# Patient Record
Sex: MALE | Race: WHITE | Employment: OTHER | URBAN - METROPOLITAN AREA
[De-identification: names, ages, dates, MRNs, and addresses within clinical notes are randomized per-mention and may not be internally consistent; named-entity substitution may affect disease eponyms.]

---

## 2017-01-27 ENCOUNTER — PATIENT MESSAGE (OUTPATIENT)
Dept: INTERNAL MEDICINE CLINIC | Facility: CLINIC | Age: 67
End: 2017-01-27

## 2017-01-27 RX ORDER — CLOTRIMAZOLE AND BETAMETHASONE DIPROPIONATE 10; .64 MG/G; MG/G
CREAM TOPICAL
Qty: 15 G | Refills: 3 | Status: SHIPPED | OUTPATIENT
Start: 2017-01-27 | End: 2018-05-24

## 2017-01-27 NOTE — TELEPHONE ENCOUNTER
From: Socorro Carvajal  To: Urbano Serrano MD  Sent: 1/27/2017 10:13 AM CST  Subject: Prescription Question    Hi Dr. Yogesh Schuster, Would you be able to renew my prescription which you gave me a few years ago for a rash I had. It was for.  CLOTRIMAZOLE-BETAMETHASO

## 2017-02-10 RX ORDER — HYDROCODONE BITARTRATE AND ACETAMINOPHEN 10; 325 MG/1; MG/1
1 TABLET ORAL EVERY 8 HOURS PRN
Qty: 90 TABLET | Refills: 0 | Status: CANCELLED | OUTPATIENT
Start: 2017-02-10

## 2017-02-10 NOTE — TELEPHONE ENCOUNTER
Refill request:  Norco  mg, take 1 tab q 8 hrs prn, #90, r-0  [place] 12/16/16    Lov: 12/5/16  Nov: none

## 2017-02-10 NOTE — TELEPHONE ENCOUNTER
From: David Carvajal  To: Oneyda Juan MD  Sent: 2/10/2017 3:56 PM CST  Subject: Medication Renewal Request    Original authorizing provider: Manoj Jean Baptiste.  MD David Gottlieb would like a refill of the following medications:  HYDROcodone-acetaminophen

## 2017-02-10 NOTE — TELEPHONE ENCOUNTER
From: Kusum Carvajal  To: Saba Rushing MD  Sent: 2/10/2017 3:53 PM CST  Subject: Medication Renewal Request    Original authorizing provider: Madonna Dennison.  MD Kusum Betancourt would like a refill of the following medications:  HYDROcodone-acetaminophen

## 2017-02-13 RX ORDER — HYDROCODONE BITARTRATE AND ACETAMINOPHEN 10; 325 MG/1; MG/1
1 TABLET ORAL EVERY 8 HOURS PRN
Qty: 90 TABLET | Refills: 0 | Status: SHIPPED | OUTPATIENT
Start: 2017-02-13 | End: 2017-03-15

## 2017-05-10 ENCOUNTER — PATIENT MESSAGE (OUTPATIENT)
Dept: NEUROLOGY | Facility: CLINIC | Age: 67
End: 2017-05-10

## 2017-05-10 NOTE — TELEPHONE ENCOUNTER
From: Angelito Carvajal  To: Angelika Aponte MD  Sent: 5/10/2017 9:02 AM CDT  Subject: Prescription Question    Dr Didi Francis, Could you please write me a Script. to renew my Margaret Rolon Qty [address]. Thank You.  Peter Caballero

## 2017-05-10 NOTE — TELEPHONE ENCOUNTER
Refill request for norco 10/325 mg, take 1 tab every 8 hrs as needed, #90, no refills    LOV: 12/5/16  NOV: none  Last [place]

## 2017-05-11 ENCOUNTER — PATIENT MESSAGE (OUTPATIENT)
Dept: NEUROLOGY | Facility: CLINIC | Age: 67
End: 2017-05-11

## 2017-05-11 RX ORDER — HYDROCODONE BITARTRATE AND ACETAMINOPHEN 10; 325 MG/1; MG/1
1 TABLET ORAL EVERY 8 HOURS PRN
Qty: 90 TABLET | Refills: 0 | Status: SHIPPED | OUTPATIENT
Start: 2017-05-11 | End: 2017-08-14

## 2017-05-11 NOTE — TELEPHONE ENCOUNTER
From: Minerva Carvajal  To: Jimena Kinney MD  Sent: 5/11/2017 11:31 AM CDT  Subject: Other    COULD YOU PLEASE VERIFY MY PRESCRIPTION RENEWAL REQUEST FOR [address] IS BEING PROCESSED. FOR SOME REASON IT WAS NOT LISTED IN MY MEDICATIONS.  [address]

## 2017-05-11 NOTE — TELEPHONE ENCOUNTER
From: Angelito Carvajal  To: Angelika Aponte MD  Sent: 5/11/2017 2:10 PM CDT  Subject: Lizeth Barillas, I've set a follow up appointment with Dr. Didi Francis for August, 21st. Also,please let me know when my prescription is ready to be picked up and I'll come next

## 2017-06-12 RX ORDER — ALPRAZOLAM 0.25 MG/1
TABLET ORAL
Qty: 90 TABLET | Refills: 1 | Status: SHIPPED
Start: 2017-06-12 | End: 2018-01-15

## 2017-07-06 PROBLEM — K40.91 RECURRENT LEFT INGUINAL HERNIA: Status: ACTIVE | Noted: 2017-07-06

## 2017-08-14 RX ORDER — HYDROCODONE BITARTRATE AND ACETAMINOPHEN 10; 325 MG/1; MG/1
1 TABLET ORAL EVERY 8 HOURS PRN
Qty: 90 TABLET | Refills: 0 | Status: SHIPPED | OUTPATIENT
Start: 2017-08-14 | End: 2017-11-10

## 2017-08-14 NOTE — TELEPHONE ENCOUNTER
Refill request for norco 10/325 mg, take 1 tab every 8 hrs as needed, #90, no refills    LOV: 12/5/16  NOV: 8/21/17  Last [place]

## 2017-08-14 NOTE — TELEPHONE ENCOUNTER
From: Homero Carvajal  Sent: 8/14/2017 11:10 AM CDT  Subject: Medication Renewal Request    Jesse SHAN Wei would like a refill of the following medications:  HYDROcodone-acetaminophen  MG Oral Tab [Ramon Moseley MD]    [facility]erred [facility]: [facility]

## 2017-08-17 PROBLEM — K64.2 GRADE III INTERNAL HEMORRHOIDS: Status: ACTIVE | Noted: 2017-08-17

## 2017-08-21 ENCOUNTER — OFFICE VISIT (OUTPATIENT)
Dept: NEUROLOGY | Facility: CLINIC | Age: 67
End: 2017-08-21

## 2017-08-21 VITALS
HEART RATE: 71 BPM | DIASTOLIC BLOOD PRESSURE: 62 MMHG | BODY MASS INDEX: 20 KG/M2 | OXYGEN SATURATION: 97 % | RESPIRATION RATE: 17 BRPM | WEIGHT: 143 LBS | SYSTOLIC BLOOD PRESSURE: 108 MMHG

## 2017-08-21 DIAGNOSIS — M54.2 NECK PAIN ON LEFT SIDE: ICD-10-CM

## 2017-08-21 DIAGNOSIS — M50.90 CERVICAL DISC DISEASE: ICD-10-CM

## 2017-08-21 DIAGNOSIS — M67.912 DYSFUNCTION OF LEFT ROTATOR CUFF: ICD-10-CM

## 2017-08-21 DIAGNOSIS — M48.02 CERVICAL STENOSIS OF SPINE: Primary | ICD-10-CM

## 2017-08-21 DIAGNOSIS — M25.512 ACUTE PAIN OF LEFT SHOULDER: ICD-10-CM

## 2017-08-21 PROCEDURE — 99214 OFFICE O/P EST MOD 30 MIN: CPT | Performed by: PHYSICAL MEDICINE & REHABILITATION

## 2017-08-21 NOTE — PROGRESS NOTES
Cervical Pain H & P    Chief Complaint:  Patient presents with:  Neck Pain: pt here for follow up with c/o neck pain that has been localized left>right side. pain is increased with turning head left and certain movements.  pt does not want to get recommende Mother      80 cause of death   • [de-identified] [OTHER] Mother    • Heart Attack Other      Myocardial infarction, uncle       Social History     Social History  Social History   Marital status:   Spouse name: N/A    Years of education: N/A  Number of patient pain in the bilateral neck   RIGHT rotation: 70 degrees Gives the patient pain in the right neck   LEFT rotation: 60 degrees Gives the patient pain in the left neck     Vascular upper extremity:   Right radial pulses: 2+   Left radial pulses: 2+ needed. The patient understands and agrees with the stated plan. Unknown Press SENDY Stover MD  8/21/2017

## 2017-08-21 NOTE — PATIENT INSTRUCTIONS
As of October 6th 2014, the Drug Enforcement Agency [facility]) is reclassifying all hydrocodone combination medications from Schedule III to Schedule II. This includes medications such as Norco, Vicodin, Lortab, Zohydro, and Vicoprofen.     What this means for y chart.      Plan  If the left shoulder is not better in one month, then I will do an ultrasound examination of the left shoulder. He will call me in one month to let me know how the left shoulder is doing.     I will hold on doing a left C7-T1 ILESI for

## 2017-08-29 ENCOUNTER — OFFICE VISIT (OUTPATIENT)
Dept: INTERNAL MEDICINE CLINIC | Facility: CLINIC | Age: 67
End: 2017-08-29

## 2017-08-29 VITALS
DIASTOLIC BLOOD PRESSURE: 64 MMHG | HEART RATE: 72 BPM | HEIGHT: 71 IN | BODY MASS INDEX: 20.02 KG/M2 | OXYGEN SATURATION: 98 % | TEMPERATURE: 97 F | WEIGHT: 143 LBS | SYSTOLIC BLOOD PRESSURE: 110 MMHG

## 2017-08-29 DIAGNOSIS — I10 ESSENTIAL HYPERTENSION, BENIGN: ICD-10-CM

## 2017-08-29 DIAGNOSIS — D50.8 OTHER IRON DEFICIENCY ANEMIA: ICD-10-CM

## 2017-08-29 DIAGNOSIS — Z00.00 WELCOME TO MEDICARE PREVENTIVE VISIT: Primary | ICD-10-CM

## 2017-08-29 DIAGNOSIS — R73.9 HYPERGLYCEMIA: ICD-10-CM

## 2017-08-29 DIAGNOSIS — Z12.5 PROSTATE CANCER SCREENING: ICD-10-CM

## 2017-08-29 PROBLEM — K64.2 GRADE III INTERNAL HEMORRHOIDS: Status: RESOLVED | Noted: 2017-08-17 | Resolved: 2017-08-29

## 2017-08-29 PROBLEM — D64.9 ABSOLUTE ANEMIA: Status: ACTIVE | Noted: 2017-08-29

## 2017-08-29 PROBLEM — D64.9 ABSOLUTE ANEMIA: Status: RESOLVED | Noted: 2017-08-29 | Resolved: 2017-08-29

## 2017-08-29 PROBLEM — K40.91 RECURRENT LEFT INGUINAL HERNIA: Status: RESOLVED | Noted: 2017-07-06 | Resolved: 2017-08-29

## 2017-08-29 LAB
ALBUMIN SERPL BCP-MCNC: 3.8 G/DL (ref 3.5–4.8)
ALBUMIN/GLOB SERPL: 1.1 {RATIO} (ref 1–2)
ALP SERPL-CCNC: 45 U/L (ref 32–100)
ALT SERPL-CCNC: 17 U/L (ref 17–63)
ANION GAP SERPL CALC-SCNC: 8 MMOL/L (ref 0–18)
AST SERPL-CCNC: 27 U/L (ref 15–41)
BASOPHILS # BLD: 0.1 K/UL (ref 0–0.2)
BASOPHILS NFR BLD: 1 %
BILIRUB SERPL-MCNC: 0.5 MG/DL (ref 0.3–1.2)
BUN SERPL-MCNC: 9 MG/DL (ref 8–20)
BUN/CREAT SERPL: 13 (ref 10–20)
CALCIUM SERPL-MCNC: 9.3 MG/DL (ref 8.5–10.5)
CHLORIDE SERPL-SCNC: 101 MMOL/L (ref 95–110)
CHOLEST SERPL-MCNC: 174 MG/DL (ref 110–200)
CO2 SERPL-SCNC: 29 MMOL/L (ref 22–32)
CREAT SERPL-MCNC: 0.69 MG/DL (ref 0.5–1.5)
EOSINOPHIL # BLD: 0.1 K/UL (ref 0–0.7)
EOSINOPHIL NFR BLD: 3 %
ERYTHROCYTE [DISTWIDTH] IN BLOOD BY AUTOMATED COUNT: 14.4 % (ref 11–15)
GLOBULIN PLAS-MCNC: 3.4 G/DL (ref 2.5–3.7)
GLUCOSE SERPL-MCNC: 77 MG/DL (ref 70–99)
HCT VFR BLD AUTO: 41.1 % (ref 41–52)
HDLC SERPL-MCNC: 53 MG/DL
HGB BLD-MCNC: 13.8 G/DL (ref 13.5–17.5)
LDLC SERPL CALC-MCNC: 105 MG/DL (ref 0–99)
LYMPHOCYTES # BLD: 1.6 K/UL (ref 1–4)
LYMPHOCYTES NFR BLD: 28 %
MCH RBC QN AUTO: 30.7 PG (ref 27–32)
MCHC RBC AUTO-ENTMCNC: 33.5 G/DL (ref 32–37)
MCV RBC AUTO: 91.7 FL (ref 80–100)
MONOCYTES # BLD: 0.6 K/UL (ref 0–1)
MONOCYTES NFR BLD: 10 %
NEUTROPHILS # BLD AUTO: 3.4 K/UL (ref 1.8–7.7)
NEUTROPHILS NFR BLD: 58 %
NONHDLC SERPL-MCNC: 121 MG/DL
OSMOLALITY UR CALC.SUM OF ELEC: 283 MOSM/KG (ref 275–295)
PLATELET # BLD AUTO: 277 K/UL (ref 140–400)
PMV BLD AUTO: 8.3 FL (ref 7.4–10.3)
POTASSIUM SERPL-SCNC: 4.5 MMOL/L (ref 3.3–5.1)
PROT SERPL-MCNC: 7.2 G/DL (ref 5.9–8.4)
PSA SERPL-MCNC: 1.7 NG/ML (ref 0–4)
RBC # BLD AUTO: 4.48 M/UL (ref 4.5–5.9)
SODIUM SERPL-SCNC: 138 MMOL/L (ref 136–144)
TRIGL SERPL-MCNC: 81 MG/DL (ref 1–149)
WBC # BLD AUTO: 5.8 K/UL (ref 4–11)

## 2017-08-29 PROCEDURE — 36415 COLL VENOUS BLD VENIPUNCTURE: CPT | Performed by: INTERNAL MEDICINE

## 2017-08-29 PROCEDURE — G0439 PPPS, SUBSEQ VISIT: HCPCS | Performed by: INTERNAL MEDICINE

## 2017-08-29 NOTE — H&P
HPI:   Sofia Yi is a 74year old male who presents for a Medicare Initial Preventative Physical exam.    Patient Active Problem List:     Prostate cancer screening     Essential hypertension, benign     Iron deficiency anemia     Hyperglycemia     W Have you had any memory issues?: 0-No    Fall/Risk Scorin    Scoring Interpretation: 0 - 3 No Risk     Depression Screening (PHQ-2/PHQ-9): Over the LAST 2 WEEKS   Little interest or pleasure in doing things (over the last two weeks)?: Not at all 11-: ELECTROCARDIOGRAM, COMPLETE      Comment: Scan to media tab 11-  No date: HERNIA SURGERY      Comment: Hernia repair  08/2017: HERNIA SURGERY  No date: KNEE ARTHROSCOPY Right   Family History   Problem Relation Age of Onset   • Heart D developed, well nourished, in no apparent distress  SKIN: no rashes, no suspicious lesions  HEENT: atraumatic, normocephalic, ears and throat are clear  Hearing assessed via: [place]  EYES: PERRLA, EOMI, conjunctiva are clear  Right Eye Visual Acuity: Blood Sugar (FSB)Annually No results found for: GLUCOSE    Cardiovascular Disease Screening     LDL Annually LDL-CHOLESTEROL (mg/dL (calc))   Date Value   08/26/2016 95        EKG One Time done    Colorectal Cancer Screening      Colonoscopy Screen every 1 Value   08/26/2016 95    No flowsheet data found. Dilated Eye exam  Annually No flowsheet data found. No flowsheet data found. COPD      Spirometry Testing Annually No results found for this or any previous visit. No flowsheet data found.         SOLER

## 2017-08-29 NOTE — ASSESSMENT & PLAN NOTE
Labs today , has had his colonoscopy 4 years ago , due next year for another one. Sees the eye doctor every 3 months. Will have hemorrhoid surgery in Oct.  And he had left hernia repair recently .

## 2017-08-30 LAB — HBA1C MFR BLD: 5.5 % (ref 4–6)

## 2017-10-02 ENCOUNTER — LAB REQUISITION (OUTPATIENT)
Dept: LAB | Facility: HOSPITAL | Age: 67
End: 2017-10-02
Payer: MEDICARE

## 2017-10-02 DIAGNOSIS — Z01.89 ENCOUNTER FOR OTHER SPECIFIED SPECIAL EXAMINATIONS (CODE): ICD-10-CM

## 2017-10-02 PROCEDURE — 88304 TISSUE EXAM BY PATHOLOGIST: CPT | Performed by: SURGERY

## 2017-11-10 NOTE — TELEPHONE ENCOUNTER
From: Michael Carvajal  Sent: 11/10/2017 7:43 AM CST  Subject: Medication Renewal Request    Jesse TORREZ Wei would like a refill of the following medications:     HYDROcodone-acetaminophen  MG Oral Tab [Ramon Moseley MD]   Patient Comment: Dr. Stuart Gunter

## 2017-11-10 NOTE — TELEPHONE ENCOUNTER
Refill request for Norco  mg take 1 tab q8hr prn, qt:90 0 refills    LOV: 8/21/17  NOV: None  Last [place]18[place]17 [place]:90 [place]

## 2017-11-13 RX ORDER — HYDROCODONE BITARTRATE AND ACETAMINOPHEN 10; 325 MG/1; MG/1
1 TABLET ORAL EVERY 8 HOURS PRN
Qty: 90 TABLET | Refills: 0 | Status: SHIPPED | OUTPATIENT
Start: 2017-11-13 | End: 2018-01-02 | Stop reason: ALTCHOICE

## 2017-11-20 RX ORDER — AMLODIPINE BESYLATE AND BENAZEPRIL HYDROCHLORIDE 5; 10 MG/1; MG/1
CAPSULE ORAL
Qty: 90 CAPSULE | Refills: 0 | Status: SHIPPED | OUTPATIENT
Start: 2017-11-20 | End: 2018-02-13

## 2018-01-02 RX ORDER — HYDROCODONE BITARTRATE AND ACETAMINOPHEN 10; 325 MG/1; MG/1
1 TABLET ORAL EVERY 8 HOURS PRN
COMMUNITY
End: 2018-01-09

## 2018-01-08 NOTE — H&P
[address] [place] [address] [address] Patient Status:  [facility] Outpatient Surgery    3/19/1950 MRN [id number]   Location [place] [address] Attending Mello Hanks, *   Hosp Day # 0 PCP Nayeli Toro Smokeless tobacco: Never Used                      Alcohol use: Yes           0.0 oz/week     Comment: 1-2 times per month, beer or wine    Allergies/Medications: Allergies: No Known Allergies    No prescriptions prior to admission.     Review of Syst and surgical management. The shoulder has been bothering him for some time. It is hard for him to get a coat or shirt on and off as well as to sleep on the left side at night. He wants to undergo surgical repair.  We scheduled this for early January after h

## 2018-01-09 ENCOUNTER — SURGERY (OUTPATIENT)
Age: 68
End: 2018-01-09

## 2018-01-09 ENCOUNTER — ANESTHESIA (OUTPATIENT)
Dept: SURGERY | Facility: HOSPITAL | Age: 68
End: 2018-01-09
Payer: MEDICARE

## 2018-01-09 ENCOUNTER — HOSPITAL ENCOUNTER (OUTPATIENT)
Facility: HOSPITAL | Age: 68
Setting detail: HOSPITAL OUTPATIENT SURGERY
Discharge: HOME | End: 2018-01-09
Attending: ORTHOPAEDIC SURGERY | Admitting: ORTHOPAEDIC SURGERY
Payer: MEDICARE

## 2018-01-09 ENCOUNTER — ANESTHESIA EVENT (OUTPATIENT)
Dept: SURGERY | Facility: HOSPITAL | Age: 68
End: 2018-01-09
Payer: MEDICARE

## 2018-01-09 VITALS
TEMPERATURE: 98 F | HEART RATE: 90 BPM | DIASTOLIC BLOOD PRESSURE: 63 MMHG | OXYGEN SATURATION: 96 % | RESPIRATION RATE: 14 BRPM | BODY MASS INDEX: 19.6 KG/M2 | WEIGHT: 140 LBS | HEIGHT: 71 IN | SYSTOLIC BLOOD PRESSURE: 115 MMHG

## 2018-01-09 DIAGNOSIS — M75.102 LEFT ROTATOR CUFF TEAR: Primary | ICD-10-CM

## 2018-01-09 PROCEDURE — 64415 NJX AA&/STRD BRCH PLXS IMG: CPT | Performed by: ORTHOPAEDIC SURGERY

## 2018-01-09 PROCEDURE — 3E0T3BZ INTRODUCTION OF ANESTHETIC AGENT INTO PERIPHERAL NERVES AND PLEXI, PERCUTANEOUS APPROACH: ICD-10-PCS | Performed by: ANESTHESIOLOGY

## 2018-01-09 PROCEDURE — 0LQ24ZZ REPAIR LEFT SHOULDER TENDON, PERCUTANEOUS ENDOSCOPIC APPROACH: ICD-10-PCS | Performed by: ORTHOPAEDIC SURGERY

## 2018-01-09 PROCEDURE — 0RNK4ZZ RELEASE LEFT SHOULDER JOINT, PERCUTANEOUS ENDOSCOPIC APPROACH: ICD-10-PCS | Performed by: ORTHOPAEDIC SURGERY

## 2018-01-09 PROCEDURE — 76942 ECHO GUIDE FOR BIOPSY: CPT | Performed by: ORTHOPAEDIC SURGERY

## 2018-01-09 PROCEDURE — 99152 MOD SED SAME PHYS/QHP 5/>YRS: CPT | Performed by: ORTHOPAEDIC SURGERY

## 2018-01-09 DEVICE — HEALIX ADVANCE BR ANCHOR W/ORTHOCORD AND NEEDLES TCP/PLGA ABSORBABLE ANCHOR (1) VIOLET (1) BLUE STRAND, SIZE 2 (5 METRIC) ORTHOCORD BRAIDED COMPOSITE SUTURE, 36 INCHES (91CM) WITH (4) MO-7 TAPERED NEEDLES 5.5MM
Type: IMPLANTABLE DEVICE | Site: SHOULDER | Status: FUNCTIONAL
Brand: HEALIX ADVANCE ORTHOCORD

## 2018-01-09 DEVICE — HEALIX ADVANCE KNOTLESS BR ANCHOR TCP/PLGA ABSORBABLE ANCHOR 5.5MM
Type: IMPLANTABLE DEVICE | Site: SHOULDER | Status: FUNCTIONAL
Brand: HEALIX ADVANCE

## 2018-01-09 RX ORDER — CEFAZOLIN SODIUM 1 G/3ML
INJECTION, POWDER, FOR SOLUTION INTRAMUSCULAR; INTRAVENOUS AS NEEDED
Status: DISCONTINUED | OUTPATIENT
Start: 2018-01-09 | End: 2018-01-09 | Stop reason: SURG

## 2018-01-09 RX ORDER — MORPHINE SULFATE 2 MG/ML
2 INJECTION, SOLUTION INTRAMUSCULAR; INTRAVENOUS EVERY 10 MIN PRN
Status: DISCONTINUED | OUTPATIENT
Start: 2018-01-09 | End: 2018-01-09

## 2018-01-09 RX ORDER — HYDROMORPHONE HYDROCHLORIDE 1 MG/ML
0.2 INJECTION, SOLUTION INTRAMUSCULAR; INTRAVENOUS; SUBCUTANEOUS EVERY 5 MIN PRN
Status: DISCONTINUED | OUTPATIENT
Start: 2018-01-09 | End: 2018-01-09

## 2018-01-09 RX ORDER — ROPIVACAINE HYDROCHLORIDE 5 MG/ML
INJECTION, SOLUTION EPIDURAL; INFILTRATION; PERINEURAL AS NEEDED
Status: DISCONTINUED | OUTPATIENT
Start: 2018-01-09 | End: 2018-01-09 | Stop reason: SURG

## 2018-01-09 RX ORDER — SODIUM CHLORIDE, SODIUM LACTATE, POTASSIUM CHLORIDE, CALCIUM CHLORIDE 600; 310; 30; 20 MG/100ML; MG/100ML; MG/100ML; MG/100ML
INJECTION, SOLUTION INTRAVENOUS CONTINUOUS
Status: DISCONTINUED | OUTPATIENT
Start: 2018-01-09 | End: 2018-01-09

## 2018-01-09 RX ORDER — HALOPERIDOL 5 MG/ML
0.25 INJECTION INTRAMUSCULAR ONCE AS NEEDED
Status: DISCONTINUED | OUTPATIENT
Start: 2018-01-09 | End: 2018-01-09

## 2018-01-09 RX ORDER — FAMOTIDINE 20 MG/1
20 TABLET ORAL ONCE
Status: DISCONTINUED | OUTPATIENT
Start: 2018-01-09 | End: 2018-01-09 | Stop reason: HOSPADM

## 2018-01-09 RX ORDER — DEXAMETHASONE SODIUM PHOSPHATE 4 MG/ML
VIAL (ML) INJECTION AS NEEDED
Status: DISCONTINUED | OUTPATIENT
Start: 2018-01-09 | End: 2018-01-09 | Stop reason: SURG

## 2018-01-09 RX ORDER — MORPHINE SULFATE 4 MG/ML
4 INJECTION, SOLUTION INTRAMUSCULAR; INTRAVENOUS EVERY 10 MIN PRN
Status: DISCONTINUED | OUTPATIENT
Start: 2018-01-09 | End: 2018-01-09

## 2018-01-09 RX ORDER — ROCURONIUM BROMIDE 10 MG/ML
INJECTION, SOLUTION INTRAVENOUS AS NEEDED
Status: DISCONTINUED | OUTPATIENT
Start: 2018-01-09 | End: 2018-01-09 | Stop reason: SURG

## 2018-01-09 RX ORDER — MORPHINE SULFATE 10 MG/ML
6 INJECTION, SOLUTION INTRAMUSCULAR; INTRAVENOUS EVERY 10 MIN PRN
Status: DISCONTINUED | OUTPATIENT
Start: 2018-01-09 | End: 2018-01-09

## 2018-01-09 RX ORDER — HYDROCODONE BITARTRATE AND ACETAMINOPHEN 5; 325 MG/1; MG/1
1 TABLET ORAL EVERY 4 HOURS PRN
Qty: 40 TABLET | Refills: 0 | Status: SHIPPED | OUTPATIENT
Start: 2018-01-09 | End: 2018-03-01 | Stop reason: DRUGHIGH

## 2018-01-09 RX ORDER — EPHEDRINE SULFATE 50 MG/ML
INJECTION, SOLUTION INTRAVENOUS AS NEEDED
Status: DISCONTINUED | OUTPATIENT
Start: 2018-01-09 | End: 2018-01-09 | Stop reason: SURG

## 2018-01-09 RX ORDER — CEFAZOLIN SODIUM/WATER 2 G/20 ML
2 SYRINGE (ML) INTRAVENOUS ONCE
Status: DISCONTINUED | OUTPATIENT
Start: 2018-01-09 | End: 2018-01-09 | Stop reason: HOSPADM

## 2018-01-09 RX ORDER — ONDANSETRON 2 MG/ML
4 INJECTION INTRAMUSCULAR; INTRAVENOUS ONCE AS NEEDED
Status: DISCONTINUED | OUTPATIENT
Start: 2018-01-09 | End: 2018-01-09

## 2018-01-09 RX ORDER — NALOXONE HYDROCHLORIDE 0.4 MG/ML
80 INJECTION, SOLUTION INTRAMUSCULAR; INTRAVENOUS; SUBCUTANEOUS AS NEEDED
Status: DISCONTINUED | OUTPATIENT
Start: 2018-01-09 | End: 2018-01-09

## 2018-01-09 RX ORDER — HYDROMORPHONE HYDROCHLORIDE 1 MG/ML
0.6 INJECTION, SOLUTION INTRAMUSCULAR; INTRAVENOUS; SUBCUTANEOUS EVERY 5 MIN PRN
Status: DISCONTINUED | OUTPATIENT
Start: 2018-01-09 | End: 2018-01-09

## 2018-01-09 RX ORDER — HYDROMORPHONE HYDROCHLORIDE 1 MG/ML
0.4 INJECTION, SOLUTION INTRAMUSCULAR; INTRAVENOUS; SUBCUTANEOUS EVERY 5 MIN PRN
Status: DISCONTINUED | OUTPATIENT
Start: 2018-01-09 | End: 2018-01-09

## 2018-01-09 RX ORDER — HYDROCODONE BITARTRATE AND ACETAMINOPHEN 5; 325 MG/1; MG/1
1 TABLET ORAL AS NEEDED
Status: DISCONTINUED | OUTPATIENT
Start: 2018-01-09 | End: 2018-01-09

## 2018-01-09 RX ORDER — HYDROCODONE BITARTRATE AND ACETAMINOPHEN 5; 325 MG/1; MG/1
2 TABLET ORAL AS NEEDED
Status: DISCONTINUED | OUTPATIENT
Start: 2018-01-09 | End: 2018-01-09

## 2018-01-09 RX ORDER — LIDOCAINE HYDROCHLORIDE 10 MG/ML
INJECTION, SOLUTION EPIDURAL; INFILTRATION; INTRACAUDAL; PERINEURAL AS NEEDED
Status: DISCONTINUED | OUTPATIENT
Start: 2018-01-09 | End: 2018-01-09 | Stop reason: SURG

## 2018-01-09 RX ORDER — HYDROCODONE BITARTRATE AND ACETAMINOPHEN 5; 325 MG/1; MG/1
1 TABLET ORAL EVERY 4 HOURS PRN
Status: DISCONTINUED | OUTPATIENT
Start: 2018-01-09 | End: 2018-01-09

## 2018-01-09 RX ORDER — ONDANSETRON 2 MG/ML
INJECTION INTRAMUSCULAR; INTRAVENOUS AS NEEDED
Status: DISCONTINUED | OUTPATIENT
Start: 2018-01-09 | End: 2018-01-09 | Stop reason: SURG

## 2018-01-09 RX ORDER — ACETAMINOPHEN 500 MG
1000 TABLET ORAL ONCE
Status: COMPLETED | OUTPATIENT
Start: 2018-01-09 | End: 2018-01-09

## 2018-01-09 RX ORDER — GLYCOPYRROLATE 0.2 MG/ML
INJECTION INTRAMUSCULAR; INTRAVENOUS AS NEEDED
Status: DISCONTINUED | OUTPATIENT
Start: 2018-01-09 | End: 2018-01-09 | Stop reason: SURG

## 2018-01-09 RX ORDER — MIDAZOLAM HYDROCHLORIDE 1 MG/ML
INJECTION INTRAMUSCULAR; INTRAVENOUS AS NEEDED
Status: DISCONTINUED | OUTPATIENT
Start: 2018-01-09 | End: 2018-01-09 | Stop reason: SURG

## 2018-01-09 RX ORDER — METOCLOPRAMIDE 10 MG/1
10 TABLET ORAL ONCE
Status: DISCONTINUED | OUTPATIENT
Start: 2018-01-09 | End: 2018-01-09 | Stop reason: HOSPADM

## 2018-01-09 RX ADMIN — ROPIVACAINE HYDROCHLORIDE 15 ML: 5 INJECTION, SOLUTION EPIDURAL; INFILTRATION; PERINEURAL at 10:52:00

## 2018-01-09 RX ADMIN — SODIUM CHLORIDE, SODIUM LACTATE, POTASSIUM CHLORIDE, CALCIUM CHLORIDE: 600; 310; 30; 20 INJECTION, SOLUTION INTRAVENOUS at 13:52:00

## 2018-01-09 RX ADMIN — SODIUM CHLORIDE, SODIUM LACTATE, POTASSIUM CHLORIDE, CALCIUM CHLORIDE: 600; 310; 30; 20 INJECTION, SOLUTION INTRAVENOUS at 11:40:00

## 2018-01-09 RX ADMIN — EPHEDRINE SULFATE 5 MG: 50 INJECTION, SOLUTION INTRAVENOUS at 13:00:00

## 2018-01-09 RX ADMIN — GLYCOPYRROLATE 0.1 MG: 0.2 INJECTION INTRAMUSCULAR; INTRAVENOUS at 11:44:00

## 2018-01-09 RX ADMIN — CEFAZOLIN SODIUM 2 G: 1 INJECTION, POWDER, FOR SOLUTION INTRAMUSCULAR; INTRAVENOUS at 12:00:00

## 2018-01-09 RX ADMIN — LIDOCAINE HYDROCHLORIDE 40 MG: 10 INJECTION, SOLUTION EPIDURAL; INFILTRATION; INTRACAUDAL; PERINEURAL at 11:44:00

## 2018-01-09 RX ADMIN — ROCURONIUM BROMIDE 5 MG: 10 INJECTION, SOLUTION INTRAVENOUS at 11:44:00

## 2018-01-09 RX ADMIN — EPHEDRINE SULFATE 10 MG: 50 INJECTION, SOLUTION INTRAVENOUS at 11:59:00

## 2018-01-09 RX ADMIN — ONDANSETRON 4 MG: 2 INJECTION INTRAMUSCULAR; INTRAVENOUS at 12:03:00

## 2018-01-09 RX ADMIN — SODIUM CHLORIDE, SODIUM LACTATE, POTASSIUM CHLORIDE, CALCIUM CHLORIDE: 600; 310; 30; 20 INJECTION, SOLUTION INTRAVENOUS at 11:30:00

## 2018-01-09 RX ADMIN — SODIUM CHLORIDE, SODIUM LACTATE, POTASSIUM CHLORIDE, CALCIUM CHLORIDE: 600; 310; 30; 20 INJECTION, SOLUTION INTRAVENOUS at 13:05:00

## 2018-01-09 RX ADMIN — MIDAZOLAM HYDROCHLORIDE 2 MG: 1 INJECTION INTRAMUSCULAR; INTRAVENOUS at 10:46:00

## 2018-01-09 RX ADMIN — EPHEDRINE SULFATE 10 MG: 50 INJECTION, SOLUTION INTRAVENOUS at 12:55:00

## 2018-01-09 RX ADMIN — SODIUM CHLORIDE, SODIUM LACTATE, POTASSIUM CHLORIDE, CALCIUM CHLORIDE: 600; 310; 30; 20 INJECTION, SOLUTION INTRAVENOUS at 12:55:00

## 2018-01-09 RX ADMIN — DEXAMETHASONE SODIUM PHOSPHATE 4 MG: 4 MG/ML VIAL (ML) INJECTION at 12:03:00

## 2018-01-09 RX ADMIN — ROPIVACAINE HYDROCHLORIDE 15 ML: 5 INJECTION, SOLUTION EPIDURAL; INFILTRATION; PERINEURAL at 10:56:00

## 2018-01-09 RX ADMIN — EPHEDRINE SULFATE 5 MG: 50 INJECTION, SOLUTION INTRAVENOUS at 13:08:00

## 2018-01-09 RX ADMIN — LIDOCAINE HYDROCHLORIDE 3 ML: 10 INJECTION, SOLUTION EPIDURAL; INFILTRATION; INTRACAUDAL; PERINEURAL at 10:51:00

## 2018-01-09 NOTE — ANESTHESIA PROCEDURE NOTES
Peripheral Block    Anesthesiologist:  Brandi Sparrow  Performed by:   Anesthesiologist  Patient Location:  PACU  Start Time:  1/9/2018 10:46 AM  End Time:  1/9/2018 10:57 AM  Site Identification: ultrasound guided, real time ultrasound guided and IMAGE STORED

## 2018-01-09 NOTE — OPERATIVE REPORT
[facility]    PATIENT'S NAME: Delma Wu   ATTENDING PHYSICIAN: Es Washington MD   OPERATING PHYSICIAN: Es Washington MD   PATIENT ACCOUNT#:   [id number]    LOCATION:  [facility] [address] PACU [address] [place] [address]  MEDICAL RECORD #:   [id number]       D intact circumferentially. There was a moderate amount of synovitis. There was a full-thickness rotator cuff tear at the supraspinatus footprint into the greater tuberosity, and this was retracted. The subacromial space was entered next.   There was a sig

## 2018-01-09 NOTE — INTERVAL H&P NOTE
Pre-op Diagnosis: ROTATOR CUFF TEAR LEFT SHOULDER    The above referenced H&P was reviewed by Lennox Nissen, MD on 1/9/2018, the patient was examined and no significant changes have occurred in the patient's condition since the H&P was performed.

## 2018-01-09 NOTE — ANESTHESIA POSTPROCEDURE EVALUATION
Patient: Angelito Carvajal    Procedure Summary     Date:  01/09/18 Room / Location:  [address] [place] [address] MAIN OR 12 / [address] [place] [address] MAIN OR    Anesthesia Start:  9635 Anesthesia Stop:  5233    Procedure:  SHOULDER ARTHROSCOPY ROTATOR CUFF REPAIR (Left Shoulder) Diagnosis:  (ROTATOR CU

## 2018-01-09 NOTE — BRIEF OP NOTE
Pre-Operative Diagnosis: ROTATOR CUFF TEAR LEFT SHOULDER     Post-Operative Diagnosis: ROTATOR CUFF TEAR LEFT SHOULDER     Procedure Performed:   Procedure(s):  ARTHROSCOPIC ROTATOR CUFF REPAIR LEFT SHOULDER    Surgeon(s) and Role:     * Cely Alcala

## 2018-01-09 NOTE — ANESTHESIA PREPROCEDURE EVALUATION
Anesthesia PreOp Note    HPI:     Rakesh Santos is a 74year old male who presents for preoperative consultation requested by: Odin Bentley MD    Date of Surgery: 1/9/2018    Procedure(s):  SHOULDER ARTHROSCOPY ROTATOR CUFF REPAIR  Indication: R 1-0.05 % External Cream Apply bid (Patient taking differently: Apply 1 Application topically 2 (two) times daily as needed.  Apply bid ) Disp: 15 g Rfl: 3 More than a month at Unknown time       Current Facility-Administered Medications Ordered in Epic:  la (36.7 °C). His blood pressure is 122/77 and his pulse is 61. His respiration is 12 and oxygen saturation is 99%.     01/02/18  1506 01/09/18  0906 01/09/18  0958 01/09/18  1008   BP:  130/76 125/78 122/77   BP Location:  Right arm Right arm    Pulse:  74 64

## 2018-01-15 RX ORDER — ALPRAZOLAM 0.25 MG/1
TABLET ORAL
Qty: 90 TABLET | Refills: 1 | Status: SHIPPED
Start: 2018-01-15 | End: 2018-07-30

## 2018-02-13 RX ORDER — AMLODIPINE BESYLATE AND BENAZEPRIL HYDROCHLORIDE 5; 10 MG/1; MG/1
CAPSULE ORAL
Qty: 90 CAPSULE | Refills: 3 | Status: SHIPPED | OUTPATIENT
Start: 2018-02-13 | End: 2019-02-10

## 2018-02-14 ENCOUNTER — TELEPHONE (OUTPATIENT)
Dept: NEUROLOGY | Facility: CLINIC | Age: 68
End: 2018-02-14

## 2018-02-14 NOTE — TELEPHONE ENCOUNTER
Pt states that he only received refill from another doctor due to him having surgery, otherwise he would only have had Dr. Erwin Santiago fill medication. He has his NOV 3/21/18 and is wondering if he can still receive refills?  Please advise

## 2018-03-01 ENCOUNTER — PATIENT MESSAGE (OUTPATIENT)
Dept: NEUROLOGY | Facility: CLINIC | Age: 68
End: 2018-03-01

## 2018-03-01 NOTE — TELEPHONE ENCOUNTER
From: Evie Carvajal  To: Vivian Upton MD  Sent: 3/1/2018 10:40 AM CST  Subject: Prescription Question     Dr. Hi Jacobs, I'm all out of the Norco, 10-325T, Qty 90 for my neck pain.  I do have Scheduled appointment for March 21st. Could you write a Script to

## 2018-03-01 NOTE — TELEPHONE ENCOUNTER
Medication request: Norco 10-325mg q8h prn pain    LOV 8/21/17  NOV 3/21/18    [place]:   Norco 5-325mg #40 prescribed by JHOAN Zuniga on 1/9/2018, Norco 10-325mg #90 filled on 11/16/17 by Dr. Daniel Block

## 2018-03-05 RX ORDER — HYDROCODONE BITARTRATE AND ACETAMINOPHEN 10; 325 MG/1; MG/1
1 TABLET ORAL EVERY 8 HOURS PRN
Qty: 90 TABLET | Refills: 0 | Status: SHIPPED | OUTPATIENT
Start: 2018-03-05 | End: 2018-06-05

## 2018-03-05 RX ORDER — HYDROCODONE BITARTRATE AND ACETAMINOPHEN 10; 325 MG/1; MG/1
1 TABLET ORAL EVERY 8 HOURS PRN
Qty: 90 TABLET | Refills: 0 | Status: CANCELLED
Start: 2018-03-05

## 2018-03-06 NOTE — TELEPHONE ENCOUNTER
From: Susu Carvajal  Sent: 3/5/2018 7:04 PM CST  Subject: Medication Renewal Request    Jesse GARCIATiffany Wei would like a refill of the following medications:     HYDROcodone-acetaminophen  MG Oral Tab [Ramon Moseley MD]    Preferred pharmacy: [facility]

## 2018-03-21 ENCOUNTER — OFFICE VISIT (OUTPATIENT)
Dept: NEUROLOGY | Facility: CLINIC | Age: 68
End: 2018-03-21

## 2018-03-21 VITALS
BODY MASS INDEX: 20.3 KG/M2 | HEIGHT: 71 IN | RESPIRATION RATE: 16 BRPM | DIASTOLIC BLOOD PRESSURE: 86 MMHG | WEIGHT: 145 LBS | SYSTOLIC BLOOD PRESSURE: 140 MMHG | HEART RATE: 60 BPM

## 2018-03-21 DIAGNOSIS — M50.90 CERVICAL DISC DISEASE: ICD-10-CM

## 2018-03-21 DIAGNOSIS — M54.2 NECK PAIN ON LEFT SIDE: ICD-10-CM

## 2018-03-21 DIAGNOSIS — M48.02 FORAMINAL STENOSIS OF CERVICAL REGION: ICD-10-CM

## 2018-03-21 DIAGNOSIS — M54.12 CERVICAL RADICULOPATHY: Primary | ICD-10-CM

## 2018-03-21 PROCEDURE — 99214 OFFICE O/P EST MOD 30 MIN: CPT | Performed by: PHYSICAL MEDICINE & REHABILITATION

## 2018-03-21 NOTE — PATIENT INSTRUCTIONS
As of October 6th 2014, the Drug Enforcement Agency [facility]) is reclassifying all hydrocodone combination medications from Schedule III to Schedule II. This includes medications such as Norco, Vicodin, Lortab, Zohydro, and Vicoprofen.     What this means for y chart.      Plan  He will finish off the PT on the left shoulder. He will consider doing neck PT after the above is done. He would like to hold on doing a left C7-T1 ILESI for now. The patient will continue with their current pain medications.

## 2018-03-21 NOTE — PROGRESS NOTES
Cervical Pain H & P    Chief Complaint:  Patient presents with:  Neck Pain: Patient presents for follow up on neck pain, last office visit 8/21/17. States he still has left side neck pain, does not radiate, pain increases when turns neck.  Rated pain a 7/10 Social History     Social History  Social History   Marital status:   Spouse name: N/A    Years of education: N/A  Number of children: N/A     Occupational History  None on file     Social History Main Topics   Smoking status: Never Smoker neck   RIGHT rotation: 60 degrees Gives the patient pain in the left neck   LEFT rotation: 45 degrees Gives the patient pain in the left neck     Vascular upper extremity:   Right radial pulses: 2+   Left radial pulses: 2+      Neurological Upper Extremity

## 2018-05-24 RX ORDER — CLOTRIMAZOLE AND BETAMETHASONE DIPROPIONATE 10; .64 MG/G; MG/G
CREAM TOPICAL
Qty: 15 G | Refills: 3 | Status: SHIPPED
Start: 2018-05-24 | End: 2018-09-27

## 2018-05-25 NOTE — TELEPHONE ENCOUNTER
From: James Monge  Sent: 5/24/2018 7:01 PM CDT  Subject: Medication Renewal Request    Jesse SHAN  Wei would like a refill of the following medications:     clotrimazole-betamethasone 1-0.05 % External Cream Sona Schwartz MD]   Patient Comment: Aaron Wang

## 2018-06-05 NOTE — TELEPHONE ENCOUNTER
Refill request for norco 10/325 mg, take 1 tab every 8 hrs as needed, #90, no refills    LOV: 3/21/18  NOV: none  Last [place]

## 2018-06-05 NOTE — TELEPHONE ENCOUNTER
From: Kassandra Carvajal  Sent: 6/5/2018 9:39 AM CDT  Subject: Medication Renewal Request    Jesse Santosswati would like a refill of the following medications:     HYDROcodone-acetaminophen  MG Oral Tab [Ramon Moseley MD]   Patient Comment: Dr. Nathan Magdaleno

## 2018-06-07 RX ORDER — HYDROCODONE BITARTRATE AND ACETAMINOPHEN 10; 325 MG/1; MG/1
1 TABLET ORAL EVERY 8 HOURS PRN
Qty: 90 TABLET | Refills: 0 | Status: SHIPPED | OUTPATIENT
Start: 2018-06-07 | End: 2018-09-12

## 2018-06-08 NOTE — TELEPHONE ENCOUNTER
Prescription locked in clinical drawer as patient will need to sign a pain contract  Routed to front office staff to schedule follow up appt    No pain contract on file

## 2018-06-12 NOTE — TELEPHONE ENCOUNTER
Pain contract reviewed and signed by patient  Prescription is ready for  at the [facility] front office.   Patient picked up rx, photo ID verified

## 2018-06-13 ENCOUNTER — MED REC SCAN ONLY (OUTPATIENT)
Dept: NEUROLOGY | Facility: CLINIC | Age: 68
End: 2018-06-13

## 2018-06-16 ENCOUNTER — PATIENT MESSAGE (OUTPATIENT)
Dept: INTERNAL MEDICINE CLINIC | Facility: CLINIC | Age: 68
End: 2018-06-16

## 2018-06-16 NOTE — TELEPHONE ENCOUNTER
From: Alexandria Carvajal  To: Carmen Ruiz MD  Sent: 6/16/2018 1:07 PM CDT  Subject: Other    Hi Dr. Jailyn Spaulding, I need to get in to see you on Monday if you can squeeze me in. I have a severe rash on my Penis again.  Kezia had it for about two weeks and been usin

## 2018-06-18 ENCOUNTER — OFFICE VISIT (OUTPATIENT)
Dept: INTERNAL MEDICINE CLINIC | Facility: CLINIC | Age: 68
End: 2018-06-18

## 2018-06-18 VITALS
BODY MASS INDEX: 20.02 KG/M2 | HEART RATE: 76 BPM | SYSTOLIC BLOOD PRESSURE: 122 MMHG | HEIGHT: 71 IN | DIASTOLIC BLOOD PRESSURE: 68 MMHG | WEIGHT: 143 LBS | TEMPERATURE: 99 F

## 2018-06-18 DIAGNOSIS — I10 ESSENTIAL HYPERTENSION, BENIGN: ICD-10-CM

## 2018-06-18 DIAGNOSIS — B35.6 TINEA CRURIS: Primary | ICD-10-CM

## 2018-06-18 PROCEDURE — 99214 OFFICE O/P EST MOD 30 MIN: CPT | Performed by: INTERNAL MEDICINE

## 2018-06-18 PROCEDURE — G0463 HOSPITAL OUTPT CLINIC VISIT: HCPCS | Performed by: INTERNAL MEDICINE

## 2018-06-18 RX ORDER — DICLOFENAC SODIUM 75 MG/1
TABLET, DELAYED RELEASE ORAL
Refills: 1 | COMMUNITY
Start: 2018-05-20 | End: 2018-09-27

## 2018-06-18 NOTE — ASSESSMENT & PLAN NOTE
Try  To keep the area dry . ,  Continue to use the ciclopirox. If no better by  Thurs will recommend seeing derm. Eat yogurt, low carbs.

## 2018-06-18 NOTE — PROGRESS NOTES
HPI:    Patient ID: Sydnie Velazquez is a 79year old male. Rash   This is a recurrent problem. The current episode started 1 to 4 weeks ago. The problem is unchanged. The affected locations include the genitalia.  The rash is characterized by pain and redn ALPRAZOLAM 0.25 MG Oral Tab TAKE 1 TABLET BY MOUTH ONCE DAILY Disp: 90 tablet Rfl: 1   Omega-3 Fatty Acids (FISH OIL OR) Take by mouth. Disp:  Rfl:    Multiple Vitamins-Minerals (MULTIVITAMIN OR) Take 1 tablet by mouth daily.  Disp:  Rfl:    HYDROcodone-ace Psychiatric: He has a normal mood and affect. His behavior is normal. Judgment and thought content normal.              ASSESSMENT/PLAN:   Essential hypertension, benign  bp is controlled.      Tinea cruris  Try  To keep the area dry . ,  Continue to use th

## 2018-07-30 RX ORDER — ALPRAZOLAM 0.25 MG/1
0.25 TABLET ORAL
Qty: 90 TABLET | Refills: 1 | Status: SHIPPED
Start: 2018-07-30 | End: 2019-01-29

## 2018-07-30 NOTE — TELEPHONE ENCOUNTER
From: Taqueria Carvajal  Sent: 7/30/2018 2:55 PM CDT  Subject: Medication Renewal Request    Jesse Carvajal would like a refill of the following medications:     ALPRAZOLAM 0.25 MG Oral Tab Judah Stewart MD]    Preferred pharmacy: [facility] [id number] -

## 2018-08-30 ENCOUNTER — OFFICE VISIT (OUTPATIENT)
Dept: INTERNAL MEDICINE CLINIC | Facility: CLINIC | Age: 68
End: 2018-08-30
Payer: MEDICARE

## 2018-08-30 ENCOUNTER — LAB ENCOUNTER (OUTPATIENT)
Dept: LAB | Age: 68
End: 2018-08-30
Attending: INTERNAL MEDICINE
Payer: MEDICARE

## 2018-08-30 VITALS
WEIGHT: 138 LBS | HEART RATE: 65 BPM | SYSTOLIC BLOOD PRESSURE: 127 MMHG | BODY MASS INDEX: 19.32 KG/M2 | RESPIRATION RATE: 16 BRPM | TEMPERATURE: 98 F | HEIGHT: 71 IN | DIASTOLIC BLOOD PRESSURE: 79 MMHG

## 2018-08-30 DIAGNOSIS — Z12.11 COLON CANCER SCREENING: ICD-10-CM

## 2018-08-30 DIAGNOSIS — B35.6 TINEA CRURIS: ICD-10-CM

## 2018-08-30 DIAGNOSIS — Z00.00 MEDICARE ANNUAL WELLNESS VISIT, SUBSEQUENT: Primary | ICD-10-CM

## 2018-08-30 DIAGNOSIS — I10 ESSENTIAL HYPERTENSION, BENIGN: ICD-10-CM

## 2018-08-30 DIAGNOSIS — R73.9 HYPERGLYCEMIA: ICD-10-CM

## 2018-08-30 DIAGNOSIS — Z12.5 PROSTATE CANCER SCREENING: ICD-10-CM

## 2018-08-30 PROBLEM — M48.02 FORAMINAL STENOSIS OF CERVICAL REGION: Status: RESOLVED | Noted: 2018-03-21 | Resolved: 2018-08-30

## 2018-08-30 LAB
ALBUMIN SERPL BCP-MCNC: 4.2 G/DL (ref 3.5–4.8)
ALBUMIN/GLOB SERPL: 1.2 {RATIO} (ref 1–2)
ALP SERPL-CCNC: 42 U/L (ref 32–100)
ALT SERPL-CCNC: 25 U/L (ref 17–63)
ANION GAP SERPL CALC-SCNC: 9 MMOL/L (ref 0–18)
AST SERPL-CCNC: 34 U/L (ref 15–41)
BASOPHILS # BLD: 0 K/UL (ref 0–0.2)
BASOPHILS NFR BLD: 1 %
BILIRUB SERPL-MCNC: 0.5 MG/DL (ref 0.3–1.2)
BILIRUB UR QL: NEGATIVE
BUN SERPL-MCNC: 11 MG/DL (ref 8–20)
BUN/CREAT SERPL: 15.5 (ref 10–20)
CALCIUM SERPL-MCNC: 9.7 MG/DL (ref 8.5–10.5)
CHLORIDE SERPL-SCNC: 98 MMOL/L (ref 95–110)
CHOLEST SERPL-MCNC: 186 MG/DL (ref 110–200)
CO2 SERPL-SCNC: 29 MMOL/L (ref 22–32)
COLOR UR: YELLOW
CREAT SERPL-MCNC: 0.71 MG/DL (ref 0.5–1.5)
EOSINOPHIL # BLD: 0.1 K/UL (ref 0–0.7)
EOSINOPHIL NFR BLD: 1 %
ERYTHROCYTE [DISTWIDTH] IN BLOOD BY AUTOMATED COUNT: 15.1 % (ref 11–15)
GLOBULIN PLAS-MCNC: 3.4 G/DL (ref 2.5–3.7)
GLUCOSE SERPL-MCNC: 92 MG/DL (ref 70–99)
GLUCOSE UR-MCNC: NEGATIVE MG/DL
HCT VFR BLD AUTO: 44.8 % (ref 41–52)
HDLC SERPL-MCNC: 72 MG/DL
HGB BLD-MCNC: 14.9 G/DL (ref 13.5–17.5)
HGB UR QL STRIP.AUTO: NEGATIVE
KETONES UR-MCNC: NEGATIVE MG/DL
LDLC SERPL CALC-MCNC: 106 MG/DL (ref 0–99)
LEUKOCYTE ESTERASE UR QL STRIP.AUTO: NEGATIVE
LYMPHOCYTES # BLD: 1.7 K/UL (ref 1–4)
LYMPHOCYTES NFR BLD: 24 %
MCH RBC QN AUTO: 31.2 PG (ref 27–32)
MCHC RBC AUTO-ENTMCNC: 33.4 G/DL (ref 32–37)
MCV RBC AUTO: 93.5 FL (ref 80–100)
MONOCYTES # BLD: 0.7 K/UL (ref 0–1)
MONOCYTES NFR BLD: 10 %
NEUTROPHILS # BLD AUTO: 4.7 K/UL (ref 1.8–7.7)
NEUTROPHILS NFR BLD: 64 %
NITRITE UR QL STRIP.AUTO: NEGATIVE
NONHDLC SERPL-MCNC: 114 MG/DL
OSMOLALITY UR CALC.SUM OF ELEC: 281 MOSM/KG (ref 275–295)
PATIENT FASTING: YES
PH UR: 7 [PH] (ref 5–8)
PLATELET # BLD AUTO: 291 K/UL (ref 140–400)
PMV BLD AUTO: 8.8 FL (ref 7.4–10.3)
POTASSIUM SERPL-SCNC: 4.2 MMOL/L (ref 3.3–5.1)
PROT SERPL-MCNC: 7.6 G/DL (ref 5.9–8.4)
PROT UR-MCNC: NEGATIVE MG/DL
PSA SERPL-MCNC: 1.8 NG/ML (ref 0–4)
RBC # BLD AUTO: 4.79 M/UL (ref 4.5–5.9)
RBC #/AREA URNS AUTO: 1 /HPF
SODIUM SERPL-SCNC: 136 MMOL/L (ref 136–144)
SP GR UR STRIP: 1.02 (ref 1–1.03)
TRIGL SERPL-MCNC: 42 MG/DL (ref 1–149)
UROBILINOGEN UR STRIP-ACNC: <2
VIT C UR-MCNC: 20 MG/DL
WBC # BLD AUTO: 7.3 K/UL (ref 4–11)
WBC #/AREA URNS AUTO: <1 /HPF

## 2018-08-30 PROCEDURE — 85025 COMPLETE CBC W/AUTO DIFF WBC: CPT

## 2018-08-30 PROCEDURE — 81003 URINALYSIS AUTO W/O SCOPE: CPT

## 2018-08-30 PROCEDURE — G0439 PPPS, SUBSEQ VISIT: HCPCS | Performed by: INTERNAL MEDICINE

## 2018-08-30 PROCEDURE — 80053 COMPREHEN METABOLIC PANEL: CPT

## 2018-08-30 PROCEDURE — 36415 COLL VENOUS BLD VENIPUNCTURE: CPT

## 2018-08-30 PROCEDURE — 83036 HEMOGLOBIN GLYCOSYLATED A1C: CPT | Performed by: INTERNAL MEDICINE

## 2018-08-30 PROCEDURE — 80061 LIPID PANEL: CPT

## 2018-08-30 NOTE — H&P
HPI:   Kam Shah is a 79year old male who presents for a Medicare Annual Wellness visit.     Patient Active Problem List:     Prostate cancer screening     Essential hypertension, benign     Iron deficiency anemia     Hyperglycemia     C3-4 left fora activities? : (P) No    Memory Problems?: (P) No      Fall/Risk Assessment     Do you have 3 or more medical conditions?: (P) 0-No    Have you fallen in the last 12 months?: (P) 0-No    Do you accidently lose urine?: (P) 0-No    Do you have difficulty seei Rfl:    Multiple Vitamins-Minerals (MULTIVITAMIN OR) Take 1 tablet by mouth daily.  Disp:  Rfl:       MEDICAL INFORMATION:   Past Medical History:   Diagnosis Date   • Chest pain    • Depression    • Fungal dermatitis    • High blood pressure    • High cho denies hx of anemia  ENDOCRINE: denies thyroid history  ALL/ASTHMA: denies hx of allergy or asthma    EXAM:   /79   Pulse 65   Temp 98.2 °F (36.8 °C)   Resp 16   Ht 5' 11\" (1.803 m)   Wt 138 lb (62.6 kg)   BMI 19.25 kg/m²      > Wt Readings from [facility] indicates understanding of these issues and agrees to the plan. The patient is asked to return in 6 months for follow up .        PREVENTATIVE SERVICES  INDICATIONS AND SCHEDULE Internal Lab or Procedure External Lab or Procedure   Diabetes Screening 4.3    No flowsheet data found. Creatinine  Annually CREATININE (mg/dL)   Date Value   08/26/2016 0.70     Creatinine (mg/dL)   Date Value   08/29/2017 0.69    No flowsheet data found.     BUN  Annually BUN (mg/dL)   Date Value   08/29/2017 9     UREA NI

## 2018-08-31 ENCOUNTER — TELEPHONE (OUTPATIENT)
Dept: INTERNAL MEDICINE CLINIC | Facility: CLINIC | Age: 68
End: 2018-08-31

## 2018-08-31 LAB — HBA1C MFR BLD: 5.5 % (ref 4–6)

## 2018-08-31 NOTE — TELEPHONE ENCOUNTER
----- Message from Usha Mcadams MD sent at 8/31/2018 10:45 AM CDT -----  Results sent on [facility]. ..   Glucose is 92, liver and kidney functions are normal, chol is 186 , hdl is 72 ldl is 106, psa is 1.8, normal and unchanged, ( prostate antigen), urine is

## 2018-09-12 NOTE — TELEPHONE ENCOUNTER
Medication request: HYDROcodone-acetaminophen  MG Oral Tab, #90, no refill  Take 1 tablet by mouth every 8 (eight) hours as needed.     [place]: 6[place]14/18    LOV: 3/21/18  NOV: 9/27/18    Medication orders pended - routed to Dr. Nelly Blankenship

## 2018-09-17 RX ORDER — HYDROCODONE BITARTRATE AND ACETAMINOPHEN 10; 325 MG/1; MG/1
1 TABLET ORAL EVERY 8 HOURS PRN
Qty: 90 TABLET | Refills: 0 | Status: SHIPPED | OUTPATIENT
Start: 2018-09-17 | End: 2018-12-16

## 2018-09-27 ENCOUNTER — OFFICE VISIT (OUTPATIENT)
Dept: NEUROLOGY | Facility: CLINIC | Age: 68
End: 2018-09-27
Payer: MEDICARE

## 2018-09-27 VITALS — RESPIRATION RATE: 17 BRPM | HEART RATE: 89 BPM | DIASTOLIC BLOOD PRESSURE: 66 MMHG | SYSTOLIC BLOOD PRESSURE: 122 MMHG

## 2018-09-27 DIAGNOSIS — M50.90 CERVICAL DISC DISEASE: ICD-10-CM

## 2018-09-27 DIAGNOSIS — M54.2 NECK PAIN ON LEFT SIDE: ICD-10-CM

## 2018-09-27 DIAGNOSIS — M48.02 CERVICAL STENOSIS OF SPINE: Primary | ICD-10-CM

## 2018-09-27 DIAGNOSIS — R29.898 RIGHT HAND WEAKNESS: ICD-10-CM

## 2018-09-27 DIAGNOSIS — G56.21 ULNAR NEUROPATHY OF RIGHT UPPER EXTREMITY: ICD-10-CM

## 2018-09-27 PROCEDURE — 99214 OFFICE O/P EST MOD 30 MIN: CPT | Performed by: PHYSICAL MEDICINE & REHABILITATION

## 2018-09-27 RX ORDER — PROPRANOLOL/HYDROCHLOROTHIAZID 40 MG-25MG
TABLET ORAL DAILY
COMMUNITY

## 2018-09-27 NOTE — PROGRESS NOTES
Cervical Pain H & P    Chief Complaint:  Patient presents with:  Neck Pain: pt here for follow up with continued neck pain increasing with certain movement/bending-turning head. left>right side neck.  pain is tolerable and pt would like to hold on left C7-T Cancer Brother         prostate   • Cancer Brother         prostate   • Heart Disorder Father         Heart valve   • Heart Disease Father         Heart disease/mitral valve, age 77 (cause of death)   • Hypertension Mother    • Other (Bowel obstruction) Mo distress. The patient is well groomed. Psychiatric:  The patient is alert and oriented x 3. The patient has a normal affect and mood. Respiratory:  No acute respiratory distress. Patient does not have a cough.     HEENT:  Extraocular muscles are i mild-mod diffuse, C7-T1 right mod foraminal bulging discs      3. Neck pain on left side    4. Ulnar neuropathy of right upper extremity    5.  Right hand weakness      Plan  The patient will continue with his home exercise program.    I explained to him th

## 2018-09-27 NOTE — PATIENT INSTRUCTIONS
As of October 6th 2014, the Drug Enforcement Agency [facility]) is reclassifying all hydrocodone combination medications from Schedule III to Schedule II. This includes medications such as Norco, Vicodin, Lortab, Zohydro, and Vicoprofen.     What this means for y patient will continue with his home exercise program.    I explained to him the process of having a left C7-T1 ILESI and he will let me know if this is something that he wants to do. The patient will continue with their current pain medications.     He w

## 2018-12-17 NOTE — TELEPHONE ENCOUNTER
Medication request: Thang Cope 10/325mg. Take 1 tablet every 8 hours prn pain. #90.  No refills    LOV-9/27/2018  NOV-3/28/2019    [place]9/18/2018

## 2018-12-18 RX ORDER — HYDROCODONE BITARTRATE AND ACETAMINOPHEN 10; 325 MG/1; MG/1
1 TABLET ORAL EVERY 8 HOURS PRN
Qty: 90 TABLET | Refills: 0 | Status: SHIPPED | OUTPATIENT
Start: 2018-12-18 | End: 2019-03-22

## 2019-01-31 RX ORDER — ALPRAZOLAM 0.25 MG/1
0.25 TABLET ORAL
Qty: 90 TABLET | Refills: 1 | Status: SHIPPED
Start: 2019-01-31 | End: 2019-07-29

## 2019-02-10 RX ORDER — AMLODIPINE BESYLATE AND BENAZEPRIL HYDROCHLORIDE 5; 10 MG/1; MG/1
1 CAPSULE ORAL
Qty: 90 CAPSULE | Refills: 3 | Status: SHIPPED | OUTPATIENT
Start: 2019-02-10 | End: 2020-02-12

## 2019-03-22 RX ORDER — HYDROCODONE BITARTRATE AND ACETAMINOPHEN 10; 325 MG/1; MG/1
1 TABLET ORAL EVERY 8 HOURS PRN
Qty: 90 TABLET | Refills: 0 | Status: SHIPPED | OUTPATIENT
Start: 2019-03-22 | End: 2019-06-24

## 2019-03-22 NOTE — TELEPHONE ENCOUNTER
Refill request for norco 10/325 mg, take 1 tab every 8 hrs as needed, #90, no refills    LOV: 9/27/18  NOV: 3/28/19  Last [place]

## 2019-03-28 ENCOUNTER — OFFICE VISIT (OUTPATIENT)
Dept: NEUROLOGY | Facility: CLINIC | Age: 69
End: 2019-03-28
Payer: MEDICARE

## 2019-03-28 VITALS — DIASTOLIC BLOOD PRESSURE: 69 MMHG | RESPIRATION RATE: 17 BRPM | SYSTOLIC BLOOD PRESSURE: 114 MMHG | HEART RATE: 86 BPM

## 2019-03-28 DIAGNOSIS — M48.02 CERVICAL STENOSIS OF SPINE: ICD-10-CM

## 2019-03-28 DIAGNOSIS — M54.12 CERVICAL RADICULOPATHY: Primary | ICD-10-CM

## 2019-03-28 DIAGNOSIS — M54.2 NECK PAIN ON LEFT SIDE: ICD-10-CM

## 2019-03-28 DIAGNOSIS — M50.90 CERVICAL DISC DISEASE: ICD-10-CM

## 2019-03-28 PROCEDURE — 99214 OFFICE O/P EST MOD 30 MIN: CPT | Performed by: PHYSICAL MEDICINE & REHABILITATION

## 2019-03-28 NOTE — PROGRESS NOTES
Cervical Pain H & P    Chief Complaint:  Patient presents with:  Neck Pain: Pt is present with no changes at this time since last seen 9/27/18. Pt states that everything is same      Patient was last seen on 9/27/18.   He is thinking about having the inject Camila Lord MD at [address] [place] [address] MAIN OR       Family History   Family History   Problem Relation Age of Onset   • Cancer Brother         prostate   • Cancer Brother         prostate   • Heart Disorder Father         Heart valve   • Heart Disease Father         He  Service: Not Asked        Blood Transfusions: Not Asked        Caffeine Concern: Yes          coffee, 1 cup daily        Occupational Exposure: Not Asked        Hobby Hazards: Not Asked        Sleep Concern: Not Asked        Stress Concern: Not As Strength: All Upper Extremity strength measurements 5/5 except:  Triceps Left: 4/5  Serratus anterior Left: 4/5   Reflexes: 2+ In the bilateral upper extremities.    Butcher's sign Right: Negative   Butcher's sigh Left: Negative       Assessment  1. left C7

## 2019-03-28 NOTE — PATIENT INSTRUCTIONS
Plan  I will hold on doing a left cervical JULI at this time. He will work on more of the triceps strengthening exercises and will monitor his strength for now.     The patient will continue with his home exercise program.    He will need to have a new

## 2019-06-24 RX ORDER — HYDROCODONE BITARTRATE AND ACETAMINOPHEN 10; 325 MG/1; MG/1
1 TABLET ORAL EVERY 8 HOURS PRN
Qty: 90 TABLET | Refills: 0 | Status: SHIPPED | OUTPATIENT
Start: 2019-06-24 | End: 2019-09-27

## 2019-06-24 NOTE — TELEPHONE ENCOUNTER
Medication request: HYDROcodone-acetaminophen 10-325MG Oral Tab, #90, no refills  Take 1 tablet by mouth every 8 (eight) hours as needed.     [place]: 3/30/19    LOV: 3/28/19  NOV: 9/26/19

## 2019-07-29 RX ORDER — ALPRAZOLAM 0.25 MG/1
0.25 TABLET ORAL
Qty: 90 TABLET | Refills: 1 | Status: SHIPPED
Start: 2019-07-29 | End: 2020-01-29

## 2019-09-03 ENCOUNTER — APPOINTMENT (OUTPATIENT)
Dept: LAB | Age: 69
End: 2019-09-03
Attending: INTERNAL MEDICINE
Payer: MEDICARE

## 2019-09-03 ENCOUNTER — OFFICE VISIT (OUTPATIENT)
Dept: INTERNAL MEDICINE CLINIC | Facility: CLINIC | Age: 69
End: 2019-09-03
Payer: MEDICARE

## 2019-09-03 VITALS
HEIGHT: 71 IN | BODY MASS INDEX: 20.3 KG/M2 | TEMPERATURE: 97 F | HEART RATE: 68 BPM | WEIGHT: 145 LBS | SYSTOLIC BLOOD PRESSURE: 112 MMHG | DIASTOLIC BLOOD PRESSURE: 76 MMHG

## 2019-09-03 DIAGNOSIS — Z00.00 MEDICARE ANNUAL WELLNESS VISIT, SUBSEQUENT: Primary | ICD-10-CM

## 2019-09-03 DIAGNOSIS — Z12.5 PROSTATE CANCER SCREENING: ICD-10-CM

## 2019-09-03 DIAGNOSIS — I10 ESSENTIAL HYPERTENSION, BENIGN: ICD-10-CM

## 2019-09-03 DIAGNOSIS — R00.2 PALPITATIONS: ICD-10-CM

## 2019-09-03 DIAGNOSIS — R73.9 HYPERGLYCEMIA: ICD-10-CM

## 2019-09-03 DIAGNOSIS — Z12.11 COLON CANCER SCREENING: ICD-10-CM

## 2019-09-03 LAB
EST. AVERAGE GLUCOSE BLD GHB EST-MCNC: 114 MG/DL (ref 68–126)
HBA1C MFR BLD HPLC: 5.6 % (ref ?–5.7)

## 2019-09-03 PROCEDURE — 83036 HEMOGLOBIN GLYCOSYLATED A1C: CPT | Performed by: INTERNAL MEDICINE

## 2019-09-03 PROCEDURE — G0439 PPPS, SUBSEQ VISIT: HCPCS | Performed by: INTERNAL MEDICINE

## 2019-09-03 PROCEDURE — 82272 OCCULT BLD FECES 1-3 TESTS: CPT | Performed by: INTERNAL MEDICINE

## 2019-09-03 NOTE — H&P
HPI:   Gretel Oneal is a 81year old male who presents for a Medicare Annual Wellness visit.     Patient Active Problem List:     Prostate cancer screening     Essential hypertension, benign     Hyperglycemia     C3-4 left mod foraminal, C5-6 bilateral m No    Difficulty dressing or bathing?: No    Problems with daily activities? : No    Memory Problems?: No      Fall/Risk Assessment          Have you fallen in the last 12 months?: 0-No                                        Fall/Risk Scorin          D hand weakness 9/27/2018   • Rotator cuff tear, left    • Shortness of breath       Past Surgical History:   Procedure Laterality Date   • ARTHROSCOPY OF JOINT UNLISTED Right     knee   • COLONOSCOPY  2013    ADNENOMA POLYP.     • ELECTROCARDIOGRAM, COMPLETE (1.803 m)   Wt 145 lb (65.8 kg)   BMI 20.22 kg/m²      > Wt Readings from Last 6 Encounters:  09/03/19 : 145 lb (65.8 kg)  08/30/18 : 138 lb (62.6 kg)  06/18/18 : 143 lb (64.9 kg)  03/21/18 : 145 lb (65.8 kg)  01/09/18 : 140 lb (63.5 kg)  10/12/17 : 145 lb PREVENTATIVE SERVICES  INDICATIONS AND SCHEDULE Internal Lab or Procedure External Lab or Procedure   Diabetes Screening      HbgA1C   Annually HEMOGLOBIN A1c (% of total Hgb)   Date Value   08/26/2016 5.6     Glycohemoglobin (HgA1c) (%)   Date Value   0 Date Value   08/30/2018 0.71    No flowsheet data found. BUN  Annually BUN (mg/dL)   Date Value   08/30/2018 11     UREA NITROGEN (BUN) (mg/dL)   Date Value   08/26/2016 11    No flowsheet data found.      Drug Serum Conc  Annually No results found for

## 2019-09-27 NOTE — TELEPHONE ENCOUNTER
Medication request: HYDROcodone-acetaminophen 10-325mg Oral Tab, #90, no refills  Take 1 tablet by mouth q8hr PRN    [place]: 6/30/19    LOV: 3/28/19  NOV: 10/17/19

## 2019-10-01 ENCOUNTER — TELEPHONE (OUTPATIENT)
Dept: NEUROLOGY | Facility: CLINIC | Age: 69
End: 2019-10-01

## 2019-10-02 RX ORDER — HYDROCODONE BITARTRATE AND ACETAMINOPHEN 10; 325 MG/1; MG/1
1 TABLET ORAL EVERY 8 HOURS PRN
Qty: 90 TABLET | Refills: 0 | Status: SHIPPED | OUTPATIENT
Start: 2019-10-02 | End: 2020-01-02

## 2019-10-17 ENCOUNTER — OFFICE VISIT (OUTPATIENT)
Dept: NEUROLOGY | Facility: CLINIC | Age: 69
End: 2019-10-17
Payer: MEDICARE

## 2019-10-17 VITALS — SYSTOLIC BLOOD PRESSURE: 130 MMHG | DIASTOLIC BLOOD PRESSURE: 80 MMHG | RESPIRATION RATE: 15 BRPM | HEART RATE: 66 BPM

## 2019-10-17 DIAGNOSIS — M54.12 CERVICAL RADICULOPATHY: ICD-10-CM

## 2019-10-17 DIAGNOSIS — M54.2 NECK PAIN ON LEFT SIDE: ICD-10-CM

## 2019-10-17 DIAGNOSIS — G56.21 ULNAR NEUROPATHY OF RIGHT UPPER EXTREMITY: Primary | ICD-10-CM

## 2019-10-17 DIAGNOSIS — M48.02 CERVICAL STENOSIS OF SPINE: ICD-10-CM

## 2019-10-17 DIAGNOSIS — M50.90 CERVICAL DISC DISEASE: ICD-10-CM

## 2019-10-17 DIAGNOSIS — R29.898 RIGHT HAND WEAKNESS: ICD-10-CM

## 2019-10-17 PROCEDURE — 99214 OFFICE O/P EST MOD 30 MIN: CPT | Performed by: PHYSICAL MEDICINE & REHABILITATION

## 2019-10-17 NOTE — PROGRESS NOTES
Cervical Pain H & P    Chief Complaint:  Patient presents with:  Neck Pain: pt here for follow up with continued left side neck pain that is localized increasing with certain positions/movements/computer work.  LOV 3/28/19    Nursing note reviewed and verif disease/mitral valve, age 79 (cause of death)   • Hypertension Mother    • Other (Bowel obstruction) Mother         80 cause of death   • Other [de-identified]) Mother    • Heart Attack Other         Myocardial infarction, uncle       Social History   Social History Not Asked        Weight Concern: Not Asked        Special Diet: Not Asked        Back Care: Not Asked        Exercise: Yes          weights, home exercises, walk outside weather permitting        Bike Helmet: Not Asked        Seat Belt: Not Asked        Se UE Muscle Strength: All Upper Extremity strength measurements 5/5 except:  FDI Right: 4-/5  ADM Right: 4-/5  EIP Right: 4-/5  Flexor Carpi Radialis Right: 4+/5  Triceps Right: 4/5   Reflexes: 2+ In the bilateral upper extremities.    Butcher's sign Right:

## 2019-10-17 NOTE — PATIENT INSTRUCTIONS
As of October 6th 2014, the Drug Enforcement Agency [facility]) is reclassifying all hydrocodone combination medications from Schedule III to Schedule II. This includes medications such as Norco, Vicodin, Lortab, Zohydro, and Vicoprofen.     What this means for y will continue with his work outs and I reviewed with him the some of the exercises like wall stretches that he should do and triceps strengthening. The patient does not need any injections at this time.     If he is not getting any better by his next gabo

## 2019-10-18 ENCOUNTER — MED REC SCAN ONLY (OUTPATIENT)
Dept: NEUROLOGY | Facility: CLINIC | Age: 69
End: 2019-10-18

## 2019-12-09 ENCOUNTER — OFFICE VISIT (OUTPATIENT)
Dept: GASTROENTEROLOGY | Facility: CLINIC | Age: 69
End: 2019-12-09
Payer: MEDICARE

## 2019-12-09 ENCOUNTER — TELEPHONE (OUTPATIENT)
Dept: GASTROENTEROLOGY | Facility: CLINIC | Age: 69
End: 2019-12-09

## 2019-12-09 VITALS
SYSTOLIC BLOOD PRESSURE: 142 MMHG | DIASTOLIC BLOOD PRESSURE: 84 MMHG | BODY MASS INDEX: 20.39 KG/M2 | HEART RATE: 67 BPM | WEIGHT: 145.63 LBS | HEIGHT: 71 IN

## 2019-12-09 DIAGNOSIS — Z12.12 SCREENING FOR COLORECTAL CANCER: ICD-10-CM

## 2019-12-09 DIAGNOSIS — R19.4 CHANGE IN BOWEL HABITS: ICD-10-CM

## 2019-12-09 DIAGNOSIS — Z12.11 COLON CANCER SCREENING: Primary | ICD-10-CM

## 2019-12-09 DIAGNOSIS — Z86.010 HISTORY OF COLON POLYPS: Primary | ICD-10-CM

## 2019-12-09 DIAGNOSIS — Z12.11 SCREENING FOR COLORECTAL CANCER: ICD-10-CM

## 2019-12-09 DIAGNOSIS — Z86.010 HISTORY OF COLON POLYPS: ICD-10-CM

## 2019-12-09 PROCEDURE — G0463 HOSPITAL OUTPT CLINIC VISIT: HCPCS | Performed by: INTERNAL MEDICINE

## 2019-12-09 PROCEDURE — 99203 OFFICE O/P NEW LOW 30 MIN: CPT | Performed by: INTERNAL MEDICINE

## 2019-12-09 RX ORDER — POLYETHYLENE GLYCOL 3350, SODIUM CHLORIDE, SODIUM BICARBONATE, POTASSIUM CHLORIDE 420; 11.2; 5.72; 1.48 G/4L; G/4L; G/4L; G/4L
4 POWDER, FOR SOLUTION ORAL ONCE
Qty: 4000 ML | Refills: 0 | Status: SHIPPED | OUTPATIENT
Start: 2019-12-09 | End: 2019-12-09

## 2019-12-09 NOTE — TELEPHONE ENCOUNTER
Scheduled for:  Colonoscopy 09461  Provider Name:    Date:  1/17/2020  Location:  [facility]  Sedation:  Mac  Time:   6052 -------------- Inform pt that he will be notified on the day before prior to arrival for his procedure  Prep:  Trilyte  Meds/Allergies

## 2019-12-09 NOTE — PATIENT INSTRUCTIONS
Schedule colonoscopy for a history of polyps/screening following a split dose TriLyte preparation and either IV sedation or monitored anesthesia care per scheduling.

## 2019-12-10 NOTE — PROGRESS NOTES
HPI:    Patient ID: Skye Romero is a 81year old male. HPI  Janice Meeks returns today in follow-up at the request of Dr. Indra Gonzalez. He was last seen at the time of his colonoscopy in March 2013.     As per previous notes Jesse underwent a screening colonoscopy i well-developed and well-nourished. No distress. Thin   HENT:   Mouth/Throat: Oropharynx is clear and moist. No oropharyngeal exudate. Eyes: Conjunctivae are normal. No scleral icterus. Neck: Neck supple. No thyromegaly present.    Cardiovascular: Norm Potassium      3.5 - 5.1 mmol/L 5.0   Chloride      98 - 112 mmol/L 103   Carbon Dioxide, Total      21.0 - 32.0 mmol/L 33.0 (H)   ANION GAP      0 - 18 mmol/L 3   BUN      7 - 18 mg/dL 12   CREATININE      0.70 - 1.30 mg/dL 0.72   BUN/CREAT Ratio      1

## 2020-01-02 RX ORDER — HYDROCODONE BITARTRATE AND ACETAMINOPHEN 10; 325 MG/1; MG/1
1 TABLET ORAL EVERY 8 HOURS PRN
Qty: 90 TABLET | Refills: 0 | Status: SHIPPED | OUTPATIENT
Start: 2020-01-02 | End: 2020-03-31

## 2020-01-02 NOTE — TELEPHONE ENCOUNTER
Refill request for norco 10/325 mg, take 1 tab every 8 hrs as needed, #90, no refills    LOV: 10/17/19  NOV: 1/23/20  [place]

## 2020-01-17 ENCOUNTER — LAB REQUISITION (OUTPATIENT)
Dept: LAB | Facility: HOSPITAL | Age: 70
End: 2020-01-17
Payer: MEDICARE

## 2020-01-17 ENCOUNTER — SURGERY CENTER DOCUMENTATION (OUTPATIENT)
Dept: SURGERY | Age: 70
End: 2020-01-17

## 2020-01-17 DIAGNOSIS — Z01.89 ENCOUNTER FOR OTHER SPECIFIED SPECIAL EXAMINATIONS: ICD-10-CM

## 2020-01-17 PROCEDURE — 88305 TISSUE EXAM BY PATHOLOGIST: CPT | Performed by: INTERNAL MEDICINE

## 2020-01-17 PROCEDURE — 45380 COLONOSCOPY AND BIOPSY: CPT | Performed by: INTERNAL MEDICINE

## 2020-01-17 NOTE — PROCEDURES
[address] Endoscopy Report      Date of Procedure:  01/17/20      Preoperative Diagnosis:  1. Personal history of adenomatous colon polyp  2. Colorectal cancer screening  3.   Subtle alteration in bowel habits      Postoperative There were telangiectatic blood vessels in the distal 1 cm of the rectum without active bleeding. A staple was visible from prior stapled hemorrhoidectomy.   There were no other colonic polyps, mass lesions, vascular anomalies or signs of inflammation see

## 2020-01-20 ENCOUNTER — TELEPHONE (OUTPATIENT)
Dept: GASTROENTEROLOGY | Facility: CLINIC | Age: 70
End: 2020-01-20

## 2020-01-20 NOTE — TELEPHONE ENCOUNTER
Entered into Epic:Recall colon in 10 years per Dr. Francisco Hagen. Last Colon done 1/17/2020, next due 1/17/2030. HM updated. Results note read by pt via [employer].

## 2020-01-20 NOTE — TELEPHONE ENCOUNTER
----- Message from Veronica Altman MD sent at 1/18/2020  1:42 PM CST -----  Please see the following My Chart message sent to the patient:    Urbano Fritz left a message on your voicemail. Your colonoscopy revealed #1 small polyp which was removed.   Roby Salgado

## 2020-01-20 NOTE — TELEPHONE ENCOUNTER
Viewed by Meron Cordero on 1/19/2020  8:07 AM   Written by Monika Montes MD on 1/18/2020  1:42 PM   \"Jesse,     I left a message on your voicemail.      Your colonoscopy revealed #1 small polyp which was removed.  This polyp was hyperplastic which is

## 2020-01-23 ENCOUNTER — OFFICE VISIT (OUTPATIENT)
Dept: NEUROLOGY | Facility: CLINIC | Age: 70
End: 2020-01-23
Payer: MEDICARE

## 2020-01-23 VITALS — HEART RATE: 60 BPM | RESPIRATION RATE: 17 BRPM | SYSTOLIC BLOOD PRESSURE: 140 MMHG | DIASTOLIC BLOOD PRESSURE: 68 MMHG

## 2020-01-23 DIAGNOSIS — M54.12 CERVICAL RADICULOPATHY: Primary | ICD-10-CM

## 2020-01-23 DIAGNOSIS — M50.90 CERVICAL DISC DISEASE: ICD-10-CM

## 2020-01-23 DIAGNOSIS — R29.898 RIGHT HAND WEAKNESS: ICD-10-CM

## 2020-01-23 DIAGNOSIS — M48.02 CERVICAL STENOSIS OF SPINE: ICD-10-CM

## 2020-01-23 DIAGNOSIS — M54.2 NECK PAIN ON LEFT SIDE: ICD-10-CM

## 2020-01-23 PROCEDURE — 99214 OFFICE O/P EST MOD 30 MIN: CPT | Performed by: PHYSICAL MEDICINE & REHABILITATION

## 2020-01-23 RX ORDER — POLYETHYLENE GLYCOL 3350, SODIUM CHLORIDE, SODIUM BICARBONATE, POTASSIUM CHLORIDE 420; 11.2; 5.72; 1.48 G/4L; G/4L; G/4L; G/4L
POWDER, FOR SOLUTION ORAL AS DIRECTED
COMMUNITY
Start: 2019-12-09 | End: 2020-01-23 | Stop reason: ALTCHOICE

## 2020-01-23 NOTE — PROGRESS NOTES
Cervical Pain H & P    Chief Complaint:  Patient presents with:  Neck Pain: pt here for follow up with continued left side neck pain that is localized increasing w/ staring at phone/computer. continues wall stretches and hep which helps temporarily.  LOV 10 SURGICAL HISTORY  10/02/2017    hemorrhoidectomy   • SHOULDER ARTHROSCOPY ROTATOR CUFF REPAIR Left 1/9/2018    Performed by Trudi Vergara MD at [address] [place] [address] MAIN OR       Family History   Family History   Problem Relation Age of Onset   • Cancer Brother Emotionally abused: Not on file        Physically abused: Not on file        Forced sexual activity: Not on file    Other Topics      Concerns:         Service: Not Asked        Blood Transfusions: Not Asked        Caffeine Concern: Yes          co pulses: 2+   Left radial pulses: 2+      Neurological Upper Extremity:    Light Touch: Intact in Bilateral upper extremities. Pin Prick: Not tested. UE Muscle Strength:  All Upper Extremity strength measurements 5/5 except:  ABP Right: 4+/5  FDI Right:

## 2020-01-23 NOTE — PATIENT INSTRUCTIONS
As of October 6th 2014, the Drug Enforcement Agency [facility]) is reclassifying all hydrocodone combination medications from Schedule III to Schedule II. This includes medications such as Norco, Vicodin, Lortab, Zohydro, and Vicoprofen.     What this means for y patient will continue with his home exercise program.    He will keep up with his work outs with the weights. I will hold on doing the EMG/NCS of the arms and hands since his strength is improving.     The patient does not need any injections at this tab

## 2020-01-30 RX ORDER — ALPRAZOLAM 0.25 MG/1
0.25 TABLET ORAL
Qty: 90 TABLET | Refills: 1 | Status: SHIPPED | OUTPATIENT
Start: 2020-01-30 | End: 2020-07-31

## 2020-02-13 RX ORDER — AMLODIPINE BESYLATE AND BENAZEPRIL HYDROCHLORIDE 5; 10 MG/1; MG/1
1 CAPSULE ORAL
Qty: 90 CAPSULE | Refills: 3 | Status: SHIPPED | OUTPATIENT
Start: 2020-02-13 | End: 2021-02-22

## 2020-02-24 ENCOUNTER — PATIENT MESSAGE (OUTPATIENT)
Dept: INTERNAL MEDICINE CLINIC | Facility: CLINIC | Age: 70
End: 2020-02-24

## 2020-02-24 RX ORDER — ONDANSETRON 4 MG/1
4 TABLET, FILM COATED ORAL EVERY 8 HOURS PRN
Qty: 20 TABLET | Refills: 2 | Status: SHIPPED | OUTPATIENT
Start: 2020-02-24 | End: 2021-02-18

## 2020-02-24 NOTE — TELEPHONE ENCOUNTER
From: Dru Carvajal  To: Bella Hrarison MD  Sent: 2/24/2020 3:11 PM CST  Subject: Non-Urgent Medical Question    Hi Dr. Zackery Ayoub, Wanted to run this by you. Kezia had nausea for about 5 days now and can't seem to shake. Kezia tried [employer] and antacids.  A

## 2020-03-31 RX ORDER — HYDROCODONE BITARTRATE AND ACETAMINOPHEN 10; 325 MG/1; MG/1
1 TABLET ORAL EVERY 8 HOURS PRN
Qty: 90 TABLET | Refills: 0 | Status: SHIPPED | OUTPATIENT
Start: 2020-03-31 | End: 2020-06-30

## 2020-03-31 NOTE — TELEPHONE ENCOUNTER
Medication request: HYDROcodone-acetaminophen 10-325mg Oral Tab, #90, no refills  Take 1 tablet by mouth TID PRN pain    [place]: 1[place]/20    LOV: 1/23/20  NOV: 7/23/20

## 2020-04-07 ENCOUNTER — TELEPHONE (OUTPATIENT)
Dept: NEUROLOGY | Facility: CLINIC | Age: 70
End: 2020-04-07

## 2020-04-16 ENCOUNTER — MED REC SCAN ONLY (OUTPATIENT)
Dept: NEUROLOGY | Facility: CLINIC | Age: 70
End: 2020-04-16

## 2020-07-01 NOTE — TELEPHONE ENCOUNTER
norco 10/325 mg, take 1 tab every 8 hrs as needed, #90, no refills    LOV: 1/23/20  NOV: 7/23/20  [place]

## 2020-07-02 RX ORDER — HYDROCODONE BITARTRATE AND ACETAMINOPHEN 10; 325 MG/1; MG/1
1 TABLET ORAL EVERY 8 HOURS PRN
Qty: 90 TABLET | Refills: 0 | Status: SHIPPED | OUTPATIENT
Start: 2020-07-02 | End: 2020-10-05

## 2020-07-29 ENCOUNTER — OFFICE VISIT (OUTPATIENT)
Dept: NEUROLOGY | Facility: CLINIC | Age: 70
End: 2020-07-29
Payer: MEDICARE

## 2020-07-29 VITALS — HEIGHT: 71 IN | BODY MASS INDEX: 20.02 KG/M2 | WEIGHT: 143 LBS

## 2020-07-29 DIAGNOSIS — M48.02 CERVICAL STENOSIS OF SPINE: Primary | ICD-10-CM

## 2020-07-29 DIAGNOSIS — M54.12 CERVICAL RADICULOPATHY: ICD-10-CM

## 2020-07-29 DIAGNOSIS — M50.90 CERVICAL DISC DISEASE: ICD-10-CM

## 2020-07-29 DIAGNOSIS — G56.21 ULNAR NEUROPATHY OF RIGHT UPPER EXTREMITY: ICD-10-CM

## 2020-07-29 DIAGNOSIS — M54.2 NECK PAIN ON LEFT SIDE: ICD-10-CM

## 2020-07-29 DIAGNOSIS — R29.898 RIGHT HAND WEAKNESS: ICD-10-CM

## 2020-07-29 PROCEDURE — 99214 OFFICE O/P EST MOD 30 MIN: CPT | Performed by: PHYSICAL MEDICINE & REHABILITATION

## 2020-07-29 NOTE — PROGRESS NOTES
Cervical Pain H & P    Chief Complaint:  Patient presents with:  Neck Pain: Patient presents to follow up on neck problems. LOV 01/23/2020 patient states that his symptoms are the same. Nursing note reviewed and verified. Patient was last seen on 1. Gregor Lanes, MD at [facility] MAIN OR       Family History   Family History   Problem Relation Age of Onset   • Cancer Brother         prostate   • Cancer Brother         prostate   • Heart Disorder Father         Heart valve   • Heart Disease Father         Heart dise  Service: Not Asked        Blood Transfusions: Not Asked        Caffeine Concern: Yes          coffee, 1 cup daily        Occupational Exposure: Not Asked        Hobby Hazards: Not Asked        Sleep Concern: Not Asked        Stress Concern: Not A the patient pain in the right neck   LEFT rotation: 40 degrees Gives the patient pain in the left neck that is significant     Vascular upper extremity:   Right radial pulses: 2+   Left radial pulses: 2+      Neurological Upper Extremity:    Light Touch: I

## 2020-07-29 NOTE — PATIENT INSTRUCTIONS
Plan  The patient will continue with his home exercise program.    I spoke to him about doing a C7-T1 interlaminar epidural steroid injection. He will think about this. I will hold on doing th EMG/NCS of the right hand and arm for now.     He will con

## 2020-08-03 ENCOUNTER — TELEPHONE (OUTPATIENT)
Dept: FAMILY MEDICINE CLINIC | Facility: CLINIC | Age: 70
End: 2020-08-03

## 2020-08-03 RX ORDER — ALPRAZOLAM 0.25 MG/1
TABLET ORAL
Qty: 90 TABLET | Refills: 1 | Status: SHIPPED | OUTPATIENT
Start: 2020-08-03 | End: 2021-02-06

## 2020-08-03 NOTE — TELEPHONE ENCOUNTER
He stated no one has filled his Lorezepam as of yet, pharmacy never got a reply.         Cortney Haskins

## 2020-09-08 ENCOUNTER — OFFICE VISIT (OUTPATIENT)
Dept: INTERNAL MEDICINE CLINIC | Facility: CLINIC | Age: 70
End: 2020-09-08
Payer: MEDICARE

## 2020-09-08 VITALS
BODY MASS INDEX: 19.46 KG/M2 | TEMPERATURE: 98 F | HEART RATE: 76 BPM | DIASTOLIC BLOOD PRESSURE: 82 MMHG | HEIGHT: 71 IN | SYSTOLIC BLOOD PRESSURE: 100 MMHG | WEIGHT: 139 LBS

## 2020-09-08 DIAGNOSIS — R73.9 HYPERGLYCEMIA: ICD-10-CM

## 2020-09-08 DIAGNOSIS — I10 ESSENTIAL HYPERTENSION, BENIGN: ICD-10-CM

## 2020-09-08 DIAGNOSIS — Z00.00 MEDICARE ANNUAL WELLNESS VISIT, SUBSEQUENT: Primary | ICD-10-CM

## 2020-09-08 DIAGNOSIS — Z12.5 PROSTATE CANCER SCREENING: ICD-10-CM

## 2020-09-08 PROBLEM — B35.6 TINEA CRURIS: Status: RESOLVED | Noted: 2018-06-18 | Resolved: 2020-09-08

## 2020-09-08 PROBLEM — Z12.11 COLON CANCER SCREENING: Status: RESOLVED | Noted: 2018-08-30 | Resolved: 2020-09-08

## 2020-09-08 PROBLEM — R00.2 PALPITATIONS: Status: RESOLVED | Noted: 2019-09-03 | Resolved: 2020-09-08

## 2020-09-08 PROBLEM — G56.21 ULNAR NEUROPATHY OF RIGHT UPPER EXTREMITY: Status: RESOLVED | Noted: 2018-09-27 | Resolved: 2020-09-08

## 2020-09-08 PROBLEM — R29.898 RIGHT HAND WEAKNESS: Status: RESOLVED | Noted: 2018-09-27 | Resolved: 2020-09-08

## 2020-09-08 LAB
ALBUMIN SERPL-MCNC: 3.9 G/DL (ref 3.4–5)
ALBUMIN/GLOB SERPL: 0.9 {RATIO} (ref 1–2)
ALP LIVER SERPL-CCNC: 48 U/L (ref 45–117)
ALT SERPL-CCNC: 32 U/L (ref 16–61)
ANION GAP SERPL CALC-SCNC: 5 MMOL/L (ref 0–18)
AST SERPL-CCNC: 23 U/L (ref 15–37)
BASOPHILS # BLD AUTO: 0.03 X10(3) UL (ref 0–0.2)
BASOPHILS NFR BLD AUTO: 0.4 %
BILIRUB SERPL-MCNC: 0.4 MG/DL (ref 0.1–2)
BILIRUB UR QL: NEGATIVE
BUN BLD-MCNC: 14 MG/DL (ref 7–18)
BUN/CREAT SERPL: 15.6 (ref 10–20)
CALCIUM BLD-MCNC: 9.5 MG/DL (ref 8.5–10.1)
CHLORIDE SERPL-SCNC: 104 MMOL/L (ref 98–112)
CHOLEST SMN-MCNC: 201 MG/DL (ref ?–200)
CO2 SERPL-SCNC: 31 MMOL/L (ref 21–32)
COLOR UR: YELLOW
COMPLEXED PSA SERPL-MCNC: 2.16 NG/ML (ref ?–4)
CREAT BLD-MCNC: 0.9 MG/DL (ref 0.7–1.3)
DEPRECATED RDW RBC AUTO: 50.3 FL (ref 35.1–46.3)
EOSINOPHIL # BLD AUTO: 0.03 X10(3) UL (ref 0–0.7)
EOSINOPHIL NFR BLD AUTO: 0.4 %
ERYTHROCYTE [DISTWIDTH] IN BLOOD BY AUTOMATED COUNT: 14.4 % (ref 11–15)
EST. AVERAGE GLUCOSE BLD GHB EST-MCNC: 111 MG/DL (ref 68–126)
GLOBULIN PLAS-MCNC: 4.5 G/DL (ref 2.8–4.4)
GLUCOSE BLD-MCNC: 86 MG/DL (ref 70–99)
GLUCOSE UR-MCNC: NEGATIVE MG/DL
HBA1C MFR BLD HPLC: 5.5 % (ref ?–5.7)
HCT VFR BLD AUTO: 45.8 % (ref 39–53)
HDLC SERPL-MCNC: 78 MG/DL (ref 40–59)
HGB BLD-MCNC: 15.2 G/DL (ref 13–17.5)
HGB UR QL STRIP.AUTO: NEGATIVE
IMM GRANULOCYTES # BLD AUTO: 0.02 X10(3) UL (ref 0–1)
IMM GRANULOCYTES NFR BLD: 0.3 %
KETONES UR-MCNC: NEGATIVE MG/DL
LDLC SERPL CALC-MCNC: 115 MG/DL (ref ?–100)
LEUKOCYTE ESTERASE UR QL STRIP.AUTO: NEGATIVE
LYMPHOCYTES # BLD AUTO: 1.67 X10(3) UL (ref 1–4)
LYMPHOCYTES NFR BLD AUTO: 20.9 %
M PROTEIN MFR SERPL ELPH: 8.4 G/DL (ref 6.4–8.2)
MCH RBC QN AUTO: 31.5 PG (ref 26–34)
MCHC RBC AUTO-ENTMCNC: 33.2 G/DL (ref 31–37)
MCV RBC AUTO: 94.8 FL (ref 80–100)
MONOCYTES # BLD AUTO: 0.7 X10(3) UL (ref 0.1–1)
MONOCYTES NFR BLD AUTO: 8.8 %
NEUTROPHILS # BLD AUTO: 5.54 X10 (3) UL (ref 1.5–7.7)
NEUTROPHILS # BLD AUTO: 5.54 X10(3) UL (ref 1.5–7.7)
NEUTROPHILS NFR BLD AUTO: 69.2 %
NITRITE UR QL STRIP.AUTO: NEGATIVE
NONHDLC SERPL-MCNC: 123 MG/DL (ref ?–130)
OSMOLALITY SERPL CALC.SUM OF ELEC: 290 MOSM/KG (ref 275–295)
PATIENT FASTING Y/N/NP: YES
PATIENT FASTING Y/N/NP: YES
PH UR: 7 [PH] (ref 5–8)
PLATELET # BLD AUTO: 298 10(3)UL (ref 150–450)
POTASSIUM SERPL-SCNC: 4.3 MMOL/L (ref 3.5–5.1)
PROT UR-MCNC: NEGATIVE MG/DL
RBC # BLD AUTO: 4.83 X10(6)UL (ref 3.8–5.8)
SODIUM SERPL-SCNC: 140 MMOL/L (ref 136–145)
SP GR UR STRIP: 1.01 (ref 1–1.03)
T4 FREE SERPL-MCNC: 1 NG/DL (ref 0.8–1.7)
TRIGL SERPL-MCNC: 40 MG/DL (ref 30–149)
TSI SER-ACNC: 1.9 MIU/ML (ref 0.36–3.74)
UROBILINOGEN UR STRIP-ACNC: <2
VLDLC SERPL CALC-MCNC: 8 MG/DL (ref 0–30)
WBC # BLD AUTO: 8 X10(3) UL (ref 4–11)

## 2020-09-08 PROCEDURE — G0439 PPPS, SUBSEQ VISIT: HCPCS | Performed by: INTERNAL MEDICINE

## 2020-09-08 PROCEDURE — 36415 COLL VENOUS BLD VENIPUNCTURE: CPT | Performed by: INTERNAL MEDICINE

## 2020-09-08 RX ORDER — ASPIRIN 81 MG/1
81 TABLET ORAL DAILY
COMMUNITY

## 2020-09-08 NOTE — H&P
HPI:   Thuy Banks is a 78year old male who presents for a Medicare Annual Wellness visit.     Patient Active Problem List:     Prostate cancer screening     Essential hypertension, benign     Hyperglycemia     Medicare annual wellness visit, bala hazards?: (P) 0-No    Are you on multiple medications?: (P) 1-Yes    Does pain affect your day to day activities?: (P) 0-No     Have you had any memory issues?: (P) 0-No    Fall/Risk Scoring: (P) 1          Depression Screening (PHQ-2/PHQ-9): Over the LAST • Shortness of breath       Past Surgical History:   Procedure Laterality Date   • ARTHROSCOPY OF JOINT UNLISTED Right     knee   • COLONOSCOPY  2013    ADNENOMA POLYP.     • COLONOSCOPY  2020    repeat 2030   • ELECTROCARDIOGRAM, COMPLETE  11- (63 kg)   BMI 19.39 kg/m²      > Wt Readings from Last 6 Encounters:  09/08/20 : 139 lb (63 kg)  07/29/20 : 143 lb (64.9 kg)  12/09/19 : 145 lb 9.6 oz (66 kg)  09/03/19 : 145 lb (65.8 kg)  08/30/18 : 138 lb (62.6 kg)  06/18/18 : 143 lb (64.9 kg)      > BP or Procedure External Lab or Procedure   Diabetes Screening      HbgA1C   Annually HEMOGLOBIN A1c (% of total Hgb)   Date Value   08/26/2016 5.6     HgbA1C (%)   Date Value   09/03/2019 5.6    No flowsheet data found.     Fasting Blood Sugar (FSB)Annually N BUN (mg/dL)   Date Value   09/03/2019 12     UREA NITROGEN (BUN) (mg/dL)   Date Value   08/26/2016 11    No flowsheet data found. Drug Serum Conc  Annually No results found for: DIGOXIN, DIG, VALP No flowsheet data found.     Diabetes      HgbA1C  Annua

## 2020-09-08 NOTE — ASSESSMENT & PLAN NOTE
Physical  Labs today   Sees ophtho every 3 months  Has stable intermittent palpitations.    UTD on colonoscopy   Refuses vaccines

## 2020-10-06 RX ORDER — HYDROCODONE BITARTRATE AND ACETAMINOPHEN 10; 325 MG/1; MG/1
1 TABLET ORAL EVERY 8 HOURS PRN
Qty: 90 TABLET | Refills: 0 | Status: SHIPPED | OUTPATIENT
Start: 2020-10-06 | End: 2021-01-05

## 2020-10-06 NOTE — TELEPHONE ENCOUNTER
Refill request for norco 02929 mg, take 1 tab every 8 hrs as needed, #90, no refills    LOV: 7/29/20  NOV: None  [place]

## 2021-01-06 RX ORDER — HYDROCODONE BITARTRATE AND ACETAMINOPHEN 10; 325 MG/1; MG/1
1 TABLET ORAL EVERY 8 HOURS PRN
Qty: 90 TABLET | Refills: 0 | Status: SHIPPED | OUTPATIENT
Start: 2021-01-06 | End: 2021-04-06

## 2021-01-06 NOTE — TELEPHONE ENCOUNTER
Refill request for norco 10/325 mg, take 1 tab every 8 hrs as needed, #90, no refills    LOV: 7/29/20  NOV: 2/18/21  [place]

## 2021-01-11 ENCOUNTER — PATIENT MESSAGE (OUTPATIENT)
Dept: INTERNAL MEDICINE CLINIC | Facility: CLINIC | Age: 71
End: 2021-01-11

## 2021-01-11 RX ORDER — VALACYCLOVIR HYDROCHLORIDE 1 G/1
1 TABLET, FILM COATED ORAL EVERY 12 HOURS SCHEDULED
Qty: 14 TABLET | Refills: 1 | Status: SHIPPED | OUTPATIENT
Start: 2021-01-11 | End: 2021-01-18

## 2021-01-11 RX ORDER — CLOTRIMAZOLE AND BETAMETHASONE DIPROPIONATE 10; .64 MG/G; MG/G
CREAM TOPICAL
Qty: 15 G | Refills: 3 | Status: SHIPPED | OUTPATIENT
Start: 2021-01-11

## 2021-01-11 NOTE — TELEPHONE ENCOUNTER
From: Susu Carvajal  To: Myriam Dwyer MD  Sent: 1/11/2021 1:18 PM CST  Subject: Prescription Question    Sorry to bother you, is there any salve you can prescribe for my rash.  You had given me couple years ago Clotrimazoleand and Betamethasone 1%/0.05%bas

## 2021-01-11 NOTE — TELEPHONE ENCOUNTER
From: Loi Carvajal  To: Koby Mc MD  Sent: 1/11/2021 9:26 AM CST  Subject: Prescription Question    Hi , I noticed a couple mores shingles that popped up.  Could you please write another script for a refill of the Valacyclovir that you had p

## 2021-02-04 RX ORDER — ALCLOMETASONE DIPROPIONATE 0.5 MG/G
CREAM TOPICAL
Qty: 15 G | Refills: 2 | Status: SHIPPED | OUTPATIENT
Start: 2021-02-04

## 2021-02-07 RX ORDER — ALPRAZOLAM 0.25 MG/1
0.25 TABLET ORAL DAILY
Qty: 90 TABLET | Refills: 1 | Status: SHIPPED | OUTPATIENT
Start: 2021-02-07 | End: 2021-08-04

## 2021-02-18 ENCOUNTER — OFFICE VISIT (OUTPATIENT)
Dept: NEUROLOGY | Facility: CLINIC | Age: 71
End: 2021-02-18
Payer: MEDICARE

## 2021-02-18 VITALS — BODY MASS INDEX: 20.3 KG/M2 | HEIGHT: 71 IN | WEIGHT: 145 LBS

## 2021-02-18 DIAGNOSIS — M50.90 CERVICAL DISC DISEASE: ICD-10-CM

## 2021-02-18 DIAGNOSIS — M47.812 CERVICAL SPONDYLOSIS: ICD-10-CM

## 2021-02-18 DIAGNOSIS — M48.02 CERVICAL SPINAL STENOSIS: ICD-10-CM

## 2021-02-18 DIAGNOSIS — M54.12 CERVICAL RADICULOPATHY: Primary | ICD-10-CM

## 2021-02-18 DIAGNOSIS — M54.2 NECK PAIN: ICD-10-CM

## 2021-02-18 PROCEDURE — 99214 OFFICE O/P EST MOD 30 MIN: CPT | Performed by: PHYSICAL MEDICINE & REHABILITATION

## 2021-02-18 NOTE — PROGRESS NOTES
Cervical Pain H & P    Chief Complaint:  Patient presents with:  Neck Pain: LOV: 07/29/2020 Patient follow up for neck pain. Patient still doing home exercies. Patient did not need injections and no EMG done.  Patient still taking Norco.  Patient still has • HERNIA SURGERY Left 08/2017    inguinal   • OTHER SURGICAL HISTORY  10/02/2017    hemorrhoidectomy   • SHOULDER ARTHROSCOPY ROTATOR CUFF REPAIR Left 1/9/2018    Performed by Bess Roldan MD at [facility] current or ex partner: Not on file        Emotionally abused: Not on file        Physically abused: Not on file        Forced sexual activity: Not on file    Other Topics      Concerns:         Service: Not Asked        Blood Transfusions: Not Aske Spine:    Posture: moderate chin forward superiorly rotated protracted shoulder posture.    Shoulders: Level   Head: In neutral   Spinous Processes Palpations: Non-tender for all Spinous Processes   Z-Joints Palpations: Tender at  bilateral OA, bilateral C1

## 2021-02-18 NOTE — PATIENT INSTRUCTIONS
Plan  The patient will continue with his home exercise program which I reviewed with him in the office today. He will continue with the Marcella for the pain as he has been taking it. The patient does not need any injections at this time.     Since the

## 2021-02-22 RX ORDER — AMLODIPINE BESYLATE AND BENAZEPRIL HYDROCHLORIDE 5; 10 MG/1; MG/1
CAPSULE ORAL
Qty: 90 CAPSULE | Refills: 3 | Status: SHIPPED | OUTPATIENT
Start: 2021-02-22

## 2021-03-12 ENCOUNTER — PATIENT MESSAGE (OUTPATIENT)
Dept: INTERNAL MEDICINE CLINIC | Facility: CLINIC | Age: 71
End: 2021-03-12

## 2021-03-15 NOTE — TELEPHONE ENCOUNTER
From: Homero Carvajal  To: Maida Ross MD  Sent: 3/12/2021 3:42 PM CST  Subject: Other    Hi Dr. Gudelia Montalvo, Just want to let you know that Jose Mcgrath and I received our First Pfzier Covid shot on Wed. the 10th. Were Scheduled for our second shot April 7.  Take Care

## 2021-04-07 RX ORDER — HYDROCODONE BITARTRATE AND ACETAMINOPHEN 10; 325 MG/1; MG/1
1 TABLET ORAL EVERY 8 HOURS PRN
Qty: 90 TABLET | Refills: 0 | Status: SHIPPED | OUTPATIENT
Start: 2021-04-07 | End: 2021-07-07

## 2021-04-07 NOTE — TELEPHONE ENCOUNTER
Narcotic Refill Request    Medication request: HYDROcodone-acetaminophen  MG  Take 1 tablet by mouth every 8 (eight) hours as needed.     LOV: 2/18/21  NOV: 8/16/21     [place] 1[place]7/21 #90

## 2021-07-08 RX ORDER — HYDROCODONE BITARTRATE AND ACETAMINOPHEN 10; 325 MG/1; MG/1
1 TABLET ORAL EVERY 8 HOURS PRN
Qty: 90 TABLET | Refills: 0 | Status: SHIPPED | OUTPATIENT
Start: 2021-07-08 | End: 2021-10-08

## 2021-07-08 NOTE — TELEPHONE ENCOUNTER
Narcotic Refill Request    Medication request: HYDROcodone-acetaminophen  MG Take 1 tablet by mouth every 8 (eight) hours as needed.     LOV: 2/18/21  NOV: 8/16/21     [place] 4/7/21 #90

## 2021-08-05 RX ORDER — ALPRAZOLAM 0.25 MG/1
0.25 TABLET ORAL DAILY
Qty: 90 TABLET | Refills: 1 | Status: SHIPPED | OUTPATIENT
Start: 2021-08-05 | End: 2021-11-05

## 2021-08-16 ENCOUNTER — OFFICE VISIT (OUTPATIENT)
Dept: NEUROLOGY | Facility: CLINIC | Age: 71
End: 2021-08-16
Payer: MEDICARE

## 2021-08-16 VITALS
DIASTOLIC BLOOD PRESSURE: 78 MMHG | WEIGHT: 140 LBS | BODY MASS INDEX: 19.6 KG/M2 | SYSTOLIC BLOOD PRESSURE: 126 MMHG | OXYGEN SATURATION: 97 % | HEIGHT: 71 IN | HEART RATE: 69 BPM

## 2021-08-16 DIAGNOSIS — M48.02 CERVICAL SPINAL STENOSIS: ICD-10-CM

## 2021-08-16 DIAGNOSIS — M25.532 LEFT WRIST PAIN: Primary | ICD-10-CM

## 2021-08-16 DIAGNOSIS — M47.812 CERVICAL SPONDYLOSIS: ICD-10-CM

## 2021-08-16 DIAGNOSIS — M50.90 CERVICAL DISC DISEASE: ICD-10-CM

## 2021-08-16 DIAGNOSIS — M54.12 CERVICAL RADICULOPATHY: ICD-10-CM

## 2021-08-16 PROCEDURE — 99214 OFFICE O/P EST MOD 30 MIN: CPT | Performed by: PHYSICAL MEDICINE & REHABILITATION

## 2021-08-16 NOTE — PATIENT INSTRUCTIONS
Plan  He would like to continue to hold on having the EMG/NCs of the right arm and hand since the symptoms are unchanged and the strength is similar.     He will continue to monitor the left wrist pain and will decrease the amount of lifting that he is do

## 2021-08-16 NOTE — PROGRESS NOTES
Cervical Pain H & P    Chief Complaint:  Patient presents with:  Neck Pain: LOV 02/18/21. Patient is here to f/u for neck pain. He is still doing his HEP and notices improvement for about 1-2 hours and pain comes back. Pain 4-7/10. Norco 1tab daily PRN.  No History   Problem Relation Age of Onset   • Cancer Brother         prostate   • Cancer Brother         prostate   • Heart Disorder Father         Heart valve   • Heart Disease Father         Heart disease/mitral valve, age 77 (cause of death)   • Hypertens Transportation Needs:       Lack of Transportation (Medical):       Lack of Transportation (Non-Medical):   Physical Activity:       Days of Exercise per Week:       Minutes of Exercise per Session:   Stress:       Feeling of Stress :   Social Connection Palpations: Tender at  bilateral OA, bilateral C1-2, bilateral C2-3, bilateral C3-4, bilateral C4-5, bilateral C5-6 and bilateral C6-7   Muscular Palpations: Tender at  bilateral pectoralis minor  bilateral scalene     Vascular upper extremity:   Right rad these do not help, he might need to have OT. He will continue with one Norco per day as needed for the pain. He will follow up in 6 months or sooner if needed. The patient understands and agrees with the stated plan. Kaely Jeffers MD  8/16/20

## 2021-09-14 ENCOUNTER — LAB ENCOUNTER (OUTPATIENT)
Dept: LAB | Age: 71
End: 2021-09-14
Attending: INTERNAL MEDICINE
Payer: MEDICARE

## 2021-09-14 ENCOUNTER — OFFICE VISIT (OUTPATIENT)
Dept: INTERNAL MEDICINE CLINIC | Facility: CLINIC | Age: 71
End: 2021-09-14
Payer: MEDICARE

## 2021-09-14 VITALS
BODY MASS INDEX: 19.7 KG/M2 | SYSTOLIC BLOOD PRESSURE: 118 MMHG | HEIGHT: 69 IN | DIASTOLIC BLOOD PRESSURE: 76 MMHG | HEART RATE: 72 BPM | TEMPERATURE: 98 F | WEIGHT: 133 LBS

## 2021-09-14 DIAGNOSIS — R63.4 WEIGHT LOSS: ICD-10-CM

## 2021-09-14 DIAGNOSIS — I10 ESSENTIAL HYPERTENSION, BENIGN: ICD-10-CM

## 2021-09-14 DIAGNOSIS — Z12.5 PROSTATE CANCER SCREENING: ICD-10-CM

## 2021-09-14 DIAGNOSIS — Z00.00 MEDICARE ANNUAL WELLNESS VISIT, SUBSEQUENT: Primary | ICD-10-CM

## 2021-09-14 DIAGNOSIS — R73.9 HYPERGLYCEMIA: ICD-10-CM

## 2021-09-14 DIAGNOSIS — M25.532 LEFT WRIST PAIN: ICD-10-CM

## 2021-09-14 PROBLEM — M54.2 NECK PAIN: Status: RESOLVED | Noted: 2021-02-18 | Resolved: 2021-09-14

## 2021-09-14 LAB
ALBUMIN SERPL-MCNC: 3.8 G/DL (ref 3.4–5)
ALBUMIN/GLOB SERPL: 1.1 {RATIO} (ref 1–2)
ALP LIVER SERPL-CCNC: 42 U/L
ALT SERPL-CCNC: 35 U/L
ANION GAP SERPL CALC-SCNC: 6 MMOL/L (ref 0–18)
AST SERPL-CCNC: 27 U/L (ref 15–37)
BASOPHILS # BLD AUTO: 0.03 X10(3) UL (ref 0–0.2)
BASOPHILS NFR BLD AUTO: 0.6 %
BILIRUB SERPL-MCNC: 0.5 MG/DL (ref 0.1–2)
BILIRUB UR QL: NEGATIVE
BUN BLD-MCNC: 13 MG/DL (ref 7–18)
BUN/CREAT SERPL: 20.6 (ref 10–20)
CALCIUM BLD-MCNC: 9.1 MG/DL (ref 8.5–10.1)
CHLORIDE SERPL-SCNC: 105 MMOL/L (ref 98–112)
CHOLEST SERPL-MCNC: 193 MG/DL (ref ?–200)
CO2 SERPL-SCNC: 29 MMOL/L (ref 21–32)
COLOR UR: YELLOW
COMPLEXED PSA SERPL-MCNC: 2.12 NG/ML (ref ?–4)
CREAT BLD-MCNC: 0.63 MG/DL
DEPRECATED RDW RBC AUTO: 47.8 FL (ref 35.1–46.3)
EOSINOPHIL # BLD AUTO: 0.05 X10(3) UL (ref 0–0.7)
EOSINOPHIL NFR BLD AUTO: 1 %
ERYTHROCYTE [DISTWIDTH] IN BLOOD BY AUTOMATED COUNT: 13.7 % (ref 11–15)
EST. AVERAGE GLUCOSE BLD GHB EST-MCNC: 111 MG/DL (ref 68–126)
GLOBULIN PLAS-MCNC: 3.4 G/DL (ref 2.8–4.4)
GLUCOSE BLD-MCNC: 83 MG/DL (ref 70–99)
GLUCOSE UR-MCNC: NEGATIVE MG/DL
HBA1C MFR BLD HPLC: 5.5 % (ref ?–5.7)
HCT VFR BLD AUTO: 41 %
HDLC SERPL-MCNC: 78 MG/DL (ref 40–59)
HGB BLD-MCNC: 13.4 G/DL
HGB UR QL STRIP.AUTO: NEGATIVE
IMM GRANULOCYTES # BLD AUTO: 0.01 X10(3) UL (ref 0–1)
IMM GRANULOCYTES NFR BLD: 0.2 %
KETONES UR-MCNC: NEGATIVE MG/DL
LDLC SERPL CALC-MCNC: 108 MG/DL (ref ?–100)
LEUKOCYTE ESTERASE UR QL STRIP.AUTO: NEGATIVE
LYMPHOCYTES # BLD AUTO: 1.29 X10(3) UL (ref 1–4)
LYMPHOCYTES NFR BLD AUTO: 25.9 %
MCH RBC QN AUTO: 30.9 PG (ref 26–34)
MCHC RBC AUTO-ENTMCNC: 32.7 G/DL (ref 31–37)
MCV RBC AUTO: 94.5 FL
MONOCYTES # BLD AUTO: 0.58 X10(3) UL (ref 0.1–1)
MONOCYTES NFR BLD AUTO: 11.6 %
NEUTROPHILS # BLD AUTO: 3.03 X10 (3) UL (ref 1.5–7.7)
NEUTROPHILS # BLD AUTO: 3.03 X10(3) UL (ref 1.5–7.7)
NEUTROPHILS NFR BLD AUTO: 60.7 %
NITRITE UR QL STRIP.AUTO: NEGATIVE
NONHDLC SERPL-MCNC: 115 MG/DL (ref ?–130)
OSMOLALITY SERPL CALC.SUM OF ELEC: 289 MOSM/KG (ref 275–295)
PATIENT FASTING Y/N/NP: YES
PATIENT FASTING Y/N/NP: YES
PH UR: 7 [PH] (ref 5–8)
PLATELET # BLD AUTO: 280 10(3)UL (ref 150–450)
POTASSIUM SERPL-SCNC: 3.9 MMOL/L (ref 3.5–5.1)
PROT SERPL-MCNC: 7.2 G/DL (ref 6.4–8.2)
PROT UR-MCNC: NEGATIVE MG/DL
RBC # BLD AUTO: 4.34 X10(6)UL
SODIUM SERPL-SCNC: 140 MMOL/L (ref 136–145)
SP GR UR STRIP: 1.02 (ref 1–1.03)
T4 FREE SERPL-MCNC: 0.9 NG/DL (ref 0.8–1.7)
TRIGL SERPL-MCNC: 34 MG/DL (ref 30–149)
TSI SER-ACNC: 1.43 MIU/ML (ref 0.36–3.74)
UROBILINOGEN UR STRIP-ACNC: <2
VLDLC SERPL CALC-MCNC: 6 MG/DL (ref 0–30)
WBC # BLD AUTO: 5 X10(3) UL (ref 4–11)

## 2021-09-14 PROCEDURE — 83036 HEMOGLOBIN GLYCOSYLATED A1C: CPT | Performed by: INTERNAL MEDICINE

## 2021-09-14 PROCEDURE — 80061 LIPID PANEL: CPT

## 2021-09-14 PROCEDURE — 85025 COMPLETE CBC W/AUTO DIFF WBC: CPT

## 2021-09-14 PROCEDURE — 81003 URINALYSIS AUTO W/O SCOPE: CPT

## 2021-09-14 PROCEDURE — G0439 PPPS, SUBSEQ VISIT: HCPCS | Performed by: INTERNAL MEDICINE

## 2021-09-14 PROCEDURE — 84443 ASSAY THYROID STIM HORMONE: CPT

## 2021-09-14 PROCEDURE — 36415 COLL VENOUS BLD VENIPUNCTURE: CPT

## 2021-09-14 PROCEDURE — 84439 ASSAY OF FREE THYROXINE: CPT

## 2021-09-14 PROCEDURE — 82272 OCCULT BLD FECES 1-3 TESTS: CPT | Performed by: INTERNAL MEDICINE

## 2021-09-14 PROCEDURE — 80053 COMPREHEN METABOLIC PANEL: CPT

## 2021-09-14 RX ORDER — EMOLLIENT COMBINATION NO.32
EMULSION, EXTENDED RELEASE TOPICAL
COMMUNITY
Start: 2021-07-09

## 2021-09-14 RX ORDER — DESONIDE 0.5 MG/G
CREAM TOPICAL
COMMUNITY
Start: 2021-07-08

## 2021-09-14 NOTE — ASSESSMENT & PLAN NOTE
Labs today , check nutrition and thyroid functions , hgbaic. Up to date on colonoscopy , denies poor appetite.

## 2021-09-14 NOTE — H&P
HPI:   Abimbola Tapia is a 73year old male who presents for a Medicare Annual Wellness visit. Patient has lost 7 lbs, he is packing , getting ready to move .   Very active, appetite is ok, no chest pain , no shortness of breath , gets palpitations at tab Advance Directives     Do you have a healthcare power of ?: No    Do you have a living will?: No   Was Medicare Assessment Questionnaire completed by patient and sent to HIM for scanning? Yes    Please go to \"Cognitive Assessment\" under Medica • Osteoarthritis    • Right hand weakness 9/27/2018   • Rotator cuff tear, left    • Shortness of breath       Past Surgical History:   Procedure Laterality Date   • ARTHROSCOPY OF JOINT UNLISTED Right     knee   • COLONOSCOPY  2013    ADNENOMA POLYP. Wt 133 lb (60.3 kg)   BMI 19.64 kg/m²      > Wt Readings from Last 6 Encounters:  09/14/21 : 133 lb (60.3 kg)  08/16/21 : 140 lb (63.5 kg)  02/18/21 : 145 lb (65.8 kg)  09/08/20 : 139 lb (63 kg)  07/29/20 : 143 lb (64.9 kg)  12/09/19 : 145 lb 9.6 oz (66 kg plan.  The patient is asked to return in 6 months  for follow up.        PREVENTATIVE SERVICES  INDICATIONS AND SCHEDULE Internal Lab or Procedure External Lab or Procedure   Diabetes Screening      HbgA1C   Annually HEMOGLOBIN A1c (% of total Hgb)   Date V Date Value   08/26/2016 0.70     Creatinine (mg/dL)   Date Value   09/08/2020 0.90    No flowsheet data found.     BUN  Annually BUN (mg/dL)   Date Value   09/08/2020 14     UREA NITROGEN (BUN) (mg/dL)   Date Value   08/26/2016 11    No flowsheet data fou

## 2021-10-08 RX ORDER — HYDROCODONE BITARTRATE AND ACETAMINOPHEN 10; 325 MG/1; MG/1
1 TABLET ORAL EVERY 8 HOURS PRN
Qty: 90 TABLET | Refills: 0 | Status: SHIPPED | OUTPATIENT
Start: 2021-10-08 | End: 2021-11-11

## 2021-10-08 NOTE — TELEPHONE ENCOUNTER
Medication request: Norco 10-325mg q8h prn pain #90    LOV 8/16/21  NOV 2/14/22    [place]: [place]8/21 #90    Patient states he is taking one tab of the Norco per day as needed only. Never exceeds more than one tab per day.  Patient feels the Norco he

## 2021-11-06 RX ORDER — ALPRAZOLAM 0.25 MG/1
0.25 TABLET ORAL DAILY
Qty: 90 TABLET | Refills: 1 | Status: SHIPPED | OUTPATIENT
Start: 2021-11-06

## 2021-11-06 NOTE — TELEPHONE ENCOUNTER
Please review; protocol failed/no protocol    Requested Prescriptions   Pending Prescriptions Disp Refills    ALPRAZolam 0.25 MG Oral Tab 90 tablet 1     Sig: Take 1 tablet (0.25 mg total) by mouth daily.         There is no refill protocol information for

## 2021-11-09 NOTE — TELEPHONE ENCOUNTER
Medication request:  HYDROcodone-acetaminophen  MG Oral Tab  Sig - Route: Take 1 tablet by mouth every 8 (eight) hours as needed.  - Oral    LOV: 8/16/21  NOV: 2/14/22    [place] 10/8/21 qty#90 r-0      New pharmacy has been entered per pat

## 2021-11-11 RX ORDER — HYDROCODONE BITARTRATE AND ACETAMINOPHEN 10; 325 MG/1; MG/1
1 TABLET ORAL EVERY 8 HOURS PRN
Qty: 90 TABLET | Refills: 0 | Status: SHIPPED | OUTPATIENT
Start: 2021-11-11

## 2022-02-08 ENCOUNTER — OFFICE VISIT (OUTPATIENT)
Dept: INTERNAL MEDICINE CLINIC | Facility: CLINIC | Age: 72
End: 2022-02-08
Payer: MEDICARE

## 2022-02-08 VITALS
BODY MASS INDEX: 20.29 KG/M2 | SYSTOLIC BLOOD PRESSURE: 132 MMHG | WEIGHT: 137 LBS | DIASTOLIC BLOOD PRESSURE: 84 MMHG | HEIGHT: 69 IN | HEART RATE: 76 BPM | TEMPERATURE: 98 F

## 2022-02-08 DIAGNOSIS — I10 ESSENTIAL HYPERTENSION, BENIGN: Primary | ICD-10-CM

## 2022-02-08 DIAGNOSIS — M25.531 ACUTE PAIN OF RIGHT WRIST: ICD-10-CM

## 2022-02-08 PROCEDURE — 99214 OFFICE O/P EST MOD 30 MIN: CPT | Performed by: INTERNAL MEDICINE

## 2022-02-11 RX ORDER — HYDROCODONE BITARTRATE AND ACETAMINOPHEN 10; 325 MG/1; MG/1
1 TABLET ORAL EVERY 8 HOURS PRN
Qty: 90 TABLET | Refills: 0 | Status: SHIPPED | OUTPATIENT
Start: 2022-02-11

## 2022-02-14 ENCOUNTER — OFFICE VISIT (OUTPATIENT)
Dept: PHYSICAL MEDICINE AND REHAB | Facility: CLINIC | Age: 72
End: 2022-02-14
Payer: MEDICARE

## 2022-02-14 VITALS
DIASTOLIC BLOOD PRESSURE: 68 MMHG | WEIGHT: 138 LBS | HEART RATE: 68 BPM | BODY MASS INDEX: 19.32 KG/M2 | SYSTOLIC BLOOD PRESSURE: 124 MMHG | HEIGHT: 71 IN

## 2022-02-14 DIAGNOSIS — M50.90 CERVICAL DISC DISEASE: ICD-10-CM

## 2022-02-14 DIAGNOSIS — M25.532 LEFT WRIST PAIN: ICD-10-CM

## 2022-02-14 DIAGNOSIS — M47.812 CERVICAL SPONDYLOSIS: ICD-10-CM

## 2022-02-14 DIAGNOSIS — M48.02 CERVICAL SPINAL STENOSIS: ICD-10-CM

## 2022-02-14 DIAGNOSIS — M54.12 CERVICAL RADICULOPATHY: Primary | ICD-10-CM

## 2022-02-14 PROCEDURE — 99214 OFFICE O/P EST MOD 30 MIN: CPT | Performed by: PHYSICAL MEDICINE & REHABILITATION

## 2022-02-14 NOTE — PATIENT INSTRUCTIONS
Plan  He will see Dr. Devyn Solorzano as planned. The patient will continue with his home exercise program.    He will continue with the Norco that he is usually taking once a day for the pain. I nora hold on doing the EMG/NCS of the bilateral arms and hands for now. He will follow up in 6 months or sooner if needed.

## 2022-02-17 ENCOUNTER — TELEPHONE (OUTPATIENT)
Dept: PHYSICAL MEDICINE AND REHAB | Facility: CLINIC | Age: 72
End: 2022-02-17

## 2022-02-17 NOTE — TELEPHONE ENCOUNTER
Recaisha'd retrospective intervention propgram document, placed in Dr. Nery Segal folder at nurses station.

## 2022-03-15 RX ORDER — AMLODIPINE BESYLATE AND BENAZEPRIL HYDROCHLORIDE 5; 10 MG/1; MG/1
CAPSULE ORAL
Qty: 90 CAPSULE | Refills: 3 | Status: SHIPPED | OUTPATIENT
Start: 2022-03-15

## 2022-05-09 RX ORDER — ALPRAZOLAM 0.25 MG/1
0.25 TABLET ORAL DAILY
Qty: 90 TABLET | Refills: 1 | Status: SHIPPED | OUTPATIENT
Start: 2022-05-09

## 2022-05-09 NOTE — TELEPHONE ENCOUNTER
Please review refill protocol failed/ no protocol  Requested Prescriptions   Pending Prescriptions Disp Refills    ALPRAZolam 0.25 MG Oral Tab 90 tablet 1     Sig: Take 1 tablet (0.25 mg total) by mouth daily.         There is no refill protocol information for this order

## 2022-05-26 NOTE — TELEPHONE ENCOUNTER
Refill Request    Medication request: HYDROcodone-acetaminophen  MG Oral Tab Take 1 tablet by mouth every 8 (eight) hours as needed. LOV: 22  Due back to clinic per last office note:  \"follow up in 6 months \"  NOV: 8/15/22     [place] 22 #90    Urine drug screen (if applicable): none  Pain contract:  10/21/2020    LOV plan (if weaning or changing medications): He will continue with the Norco that he is usually taking once a day for the pain.

## 2022-05-27 RX ORDER — HYDROCODONE BITARTRATE AND ACETAMINOPHEN 10; 325 MG/1; MG/1
1 TABLET ORAL EVERY 8 HOURS PRN
Qty: 90 TABLET | Refills: 0 | Status: SHIPPED | OUTPATIENT
Start: 2022-05-27

## 2022-08-15 ENCOUNTER — OFFICE VISIT (OUTPATIENT)
Dept: PHYSICAL MEDICINE AND REHAB | Facility: CLINIC | Age: 72
End: 2022-08-15
Payer: MEDICARE

## 2022-08-15 VITALS — OXYGEN SATURATION: 98 % | HEIGHT: 71 IN | BODY MASS INDEX: 19.32 KG/M2 | WEIGHT: 138 LBS | HEART RATE: 61 BPM

## 2022-08-15 DIAGNOSIS — M50.90 CERVICAL DISC DISEASE: ICD-10-CM

## 2022-08-15 DIAGNOSIS — M47.812 CERVICAL SPONDYLOSIS: ICD-10-CM

## 2022-08-15 DIAGNOSIS — M25.532 LEFT WRIST PAIN: ICD-10-CM

## 2022-08-15 DIAGNOSIS — M54.12 CERVICAL RADICULOPATHY: ICD-10-CM

## 2022-08-15 DIAGNOSIS — M48.02 CERVICAL SPINAL STENOSIS: Primary | ICD-10-CM

## 2022-08-15 PROCEDURE — 99214 OFFICE O/P EST MOD 30 MIN: CPT | Performed by: PHYSICAL MEDICINE & REHABILITATION

## 2022-08-15 NOTE — PATIENT INSTRUCTIONS
Plan  He would like to hold on getting a MRI of the left wrist for now. If he were to have the MRI and it were to show damage to the triangular fibular cartilage, then he might be a candidate for a platelet rich plasma injection. I told him that this would be an out of pocket expense of $700.00 and would cause increased pain for 3-7 days. He will continue with the [place] as needed for the pain. He will follow up in 6 months or sooner if needed.

## 2022-08-16 ENCOUNTER — MED REC SCAN ONLY (OUTPATIENT)
Dept: INTERNAL MEDICINE CLINIC | Facility: CLINIC | Age: 72
End: 2022-08-16

## 2022-09-15 ENCOUNTER — LAB ENCOUNTER (OUTPATIENT)
Dept: LAB | Age: 72
End: 2022-09-15
Attending: INTERNAL MEDICINE
Payer: COMMERCIAL

## 2022-09-15 ENCOUNTER — OFFICE VISIT (OUTPATIENT)
Dept: INTERNAL MEDICINE CLINIC | Facility: CLINIC | Age: 72
End: 2022-09-15
Payer: MEDICARE

## 2022-09-15 VITALS
WEIGHT: 137 LBS | HEIGHT: 68.5 IN | TEMPERATURE: 98 F | HEART RATE: 72 BPM | SYSTOLIC BLOOD PRESSURE: 120 MMHG | BODY MASS INDEX: 20.52 KG/M2 | DIASTOLIC BLOOD PRESSURE: 70 MMHG

## 2022-09-15 DIAGNOSIS — Z12.5 PROSTATE CANCER SCREENING: ICD-10-CM

## 2022-09-15 DIAGNOSIS — Z00.00 MEDICARE ANNUAL WELLNESS VISIT, SUBSEQUENT: Primary | ICD-10-CM

## 2022-09-15 DIAGNOSIS — R73.9 HYPERGLYCEMIA: ICD-10-CM

## 2022-09-15 DIAGNOSIS — I10 ESSENTIAL HYPERTENSION, BENIGN: ICD-10-CM

## 2022-09-15 DIAGNOSIS — E78.00 PURE HYPERCHOLESTEROLEMIA: ICD-10-CM

## 2022-09-15 PROBLEM — M25.532 LEFT WRIST PAIN: Status: RESOLVED | Noted: 2021-08-16 | Resolved: 2022-09-15

## 2022-09-15 PROBLEM — R63.4 WEIGHT LOSS: Status: RESOLVED | Noted: 2021-09-14 | Resolved: 2022-09-15

## 2022-09-15 PROBLEM — M25.531 ACUTE PAIN OF RIGHT WRIST: Status: RESOLVED | Noted: 2022-02-08 | Resolved: 2022-09-15

## 2022-09-15 LAB
ALBUMIN SERPL-MCNC: 4.1 G/DL (ref 3.4–5)
ALBUMIN/GLOB SERPL: 1.1 {RATIO} (ref 1–2)
ALP LIVER SERPL-CCNC: 48 U/L
ALT SERPL-CCNC: 32 U/L
ANION GAP SERPL CALC-SCNC: 5 MMOL/L (ref 0–18)
AST SERPL-CCNC: 33 U/L (ref 15–37)
BASOPHILS # BLD AUTO: 0.02 X10(3) UL (ref 0–0.2)
BASOPHILS NFR BLD AUTO: 0.3 %
BILIRUB SERPL-MCNC: 0.4 MG/DL (ref 0.1–2)
BILIRUB UR QL: NEGATIVE
BUN BLD-MCNC: 22 MG/DL (ref 7–18)
BUN/CREAT SERPL: 28.2 (ref 10–20)
CALCIUM BLD-MCNC: 9.4 MG/DL (ref 8.5–10.1)
CHLORIDE SERPL-SCNC: 105 MMOL/L (ref 98–112)
CHOLEST SERPL-MCNC: 203 MG/DL (ref ?–200)
CO2 SERPL-SCNC: 30 MMOL/L (ref 21–32)
COLOR UR: YELLOW
COMPLEXED PSA SERPL-MCNC: 2.48 NG/ML (ref ?–4)
CREAT BLD-MCNC: 0.78 MG/DL
DEPRECATED RDW RBC AUTO: 50 FL (ref 35.1–46.3)
EOSINOPHIL # BLD AUTO: 0.02 X10(3) UL (ref 0–0.7)
EOSINOPHIL NFR BLD AUTO: 0.3 %
ERYTHROCYTE [DISTWIDTH] IN BLOOD BY AUTOMATED COUNT: 14.4 % (ref 11–15)
EST. AVERAGE GLUCOSE BLD GHB EST-MCNC: 114 MG/DL (ref 68–126)
FASTING PATIENT LIPID ANSWER: YES
FASTING STATUS PATIENT QL REPORTED: YES
GFR SERPLBLD BASED ON 1.73 SQ M-ARVRAT: 96 ML/MIN/1.73M2 (ref 60–?)
GLOBULIN PLAS-MCNC: 3.7 G/DL (ref 2.8–4.4)
GLUCOSE BLD-MCNC: 84 MG/DL (ref 70–99)
GLUCOSE UR-MCNC: NEGATIVE MG/DL
HBA1C MFR BLD: 5.6 % (ref ?–5.7)
HCT VFR BLD AUTO: 43.6 %
HDLC SERPL-MCNC: 74 MG/DL (ref 40–59)
HGB BLD-MCNC: 14.4 G/DL
HGB UR QL STRIP.AUTO: NEGATIVE
IMM GRANULOCYTES # BLD AUTO: 0.02 X10(3) UL (ref 0–1)
IMM GRANULOCYTES NFR BLD: 0.3 %
KETONES UR-MCNC: NEGATIVE MG/DL
LDLC SERPL CALC-MCNC: 122 MG/DL (ref ?–100)
LEUKOCYTE ESTERASE UR QL STRIP.AUTO: NEGATIVE
LYMPHOCYTES # BLD AUTO: 1.84 X10(3) UL (ref 1–4)
LYMPHOCYTES NFR BLD AUTO: 26.1 %
MCH RBC QN AUTO: 31.4 PG (ref 26–34)
MCHC RBC AUTO-ENTMCNC: 33 G/DL (ref 31–37)
MCV RBC AUTO: 95 FL
MONOCYTES # BLD AUTO: 0.68 X10(3) UL (ref 0.1–1)
MONOCYTES NFR BLD AUTO: 9.6 %
NEUTROPHILS # BLD AUTO: 4.48 X10 (3) UL (ref 1.5–7.7)
NEUTROPHILS # BLD AUTO: 4.48 X10(3) UL (ref 1.5–7.7)
NEUTROPHILS NFR BLD AUTO: 63.4 %
NITRITE UR QL STRIP.AUTO: NEGATIVE
NONHDLC SERPL-MCNC: 129 MG/DL (ref ?–130)
OSMOLALITY SERPL CALC.SUM OF ELEC: 293 MOSM/KG (ref 275–295)
PH UR: 7 [PH] (ref 5–8)
PLATELET # BLD AUTO: 259 10(3)UL (ref 150–450)
POTASSIUM SERPL-SCNC: 3.9 MMOL/L (ref 3.5–5.1)
PROT SERPL-MCNC: 7.8 G/DL (ref 6.4–8.2)
PROT UR-MCNC: NEGATIVE MG/DL
RBC # BLD AUTO: 4.59 X10(6)UL
SODIUM SERPL-SCNC: 140 MMOL/L (ref 136–145)
SP GR UR STRIP: 1.02 (ref 1–1.03)
T4 FREE SERPL-MCNC: 1 NG/DL (ref 0.8–1.7)
TRIGL SERPL-MCNC: 40 MG/DL (ref 30–149)
TSI SER-ACNC: 2.73 MIU/ML (ref 0.36–3.74)
UROBILINOGEN UR STRIP-ACNC: 0.2
VLDLC SERPL CALC-MCNC: 7 MG/DL (ref 0–30)
WBC # BLD AUTO: 7.1 X10(3) UL (ref 4–11)

## 2022-09-15 PROCEDURE — G0439 PPPS, SUBSEQ VISIT: HCPCS | Performed by: INTERNAL MEDICINE

## 2022-09-15 PROCEDURE — 36415 COLL VENOUS BLD VENIPUNCTURE: CPT

## 2022-09-15 PROCEDURE — 80061 LIPID PANEL: CPT

## 2022-09-15 PROCEDURE — 80053 COMPREHEN METABOLIC PANEL: CPT

## 2022-09-15 PROCEDURE — 85025 COMPLETE CBC W/AUTO DIFF WBC: CPT

## 2022-09-15 PROCEDURE — 83036 HEMOGLOBIN GLYCOSYLATED A1C: CPT | Performed by: INTERNAL MEDICINE

## 2022-09-15 PROCEDURE — 81001 URINALYSIS AUTO W/SCOPE: CPT

## 2022-09-15 PROCEDURE — 81015 MICROSCOPIC EXAM OF URINE: CPT

## 2022-09-15 PROCEDURE — 84439 ASSAY OF FREE THYROXINE: CPT

## 2022-09-15 PROCEDURE — 1125F AMNT PAIN NOTED PAIN PRSNT: CPT | Performed by: INTERNAL MEDICINE

## 2022-09-15 PROCEDURE — 84443 ASSAY THYROID STIM HORMONE: CPT

## 2022-09-15 NOTE — ASSESSMENT & PLAN NOTE
Physical  Rectal today   Labs today   c scope in 2030  Sees ophtho every 3 months  Refuses pneumovax and t dap , and shingerix, does take covid vaccines.

## 2022-10-10 RX ORDER — ALPRAZOLAM 0.25 MG/1
0.25 TABLET ORAL DAILY
Qty: 90 TABLET | Refills: 1 | Status: SHIPPED | OUTPATIENT
Start: 2022-10-10 | End: 2023-05-24

## 2023-01-24 RX ORDER — AMLODIPINE BESYLATE AND BENAZEPRIL HYDROCHLORIDE 5; 10 MG/1; MG/1
CAPSULE ORAL
Qty: 90 CAPSULE | Refills: 3 | Status: SHIPPED | OUTPATIENT
Start: 2023-01-24

## 2023-05-24 RX ORDER — ALPRAZOLAM 0.25 MG/1
0.25 TABLET ORAL DAILY
Qty: 30 TABLET | Refills: 0 | Status: SHIPPED | OUTPATIENT
Start: 2023-05-24

## 2023-08-23 NOTE — TELEPHONE ENCOUNTER
Current Outpatient Medications:       ALPRAZolam 0.25 MG Oral Tab, Take 1 tablet (0.25 mg total) by mouth daily. , Disp: 30 tablet, Rfl: 0

## 2023-08-24 RX ORDER — ALPRAZOLAM 0.25 MG/1
0.25 TABLET ORAL DAILY
Qty: 90 TABLET | Refills: 1 | Status: SHIPPED | OUTPATIENT
Start: 2023-08-24

## 2023-08-24 NOTE — TELEPHONE ENCOUNTER
Please review refill protocol failed/ no protocol  Requested Prescriptions   Pending Prescriptions Disp Refills    ALPRAZolam 0.25 MG Oral Tab 30 tablet 0     Sig: Take 1 tablet (0.25 mg total) by mouth daily.        There is no refill protocol information for this order

## 2023-09-21 ENCOUNTER — OFFICE VISIT (OUTPATIENT)
Dept: INTERNAL MEDICINE CLINIC | Facility: CLINIC | Age: 73
End: 2023-09-21

## 2023-09-21 ENCOUNTER — LAB ENCOUNTER (OUTPATIENT)
Dept: LAB | Age: 73
End: 2023-09-21
Attending: INTERNAL MEDICINE
Payer: MEDICARE

## 2023-09-21 VITALS
HEIGHT: 68 IN | HEART RATE: 80 BPM | TEMPERATURE: 98 F | DIASTOLIC BLOOD PRESSURE: 80 MMHG | BODY MASS INDEX: 21.07 KG/M2 | WEIGHT: 139 LBS | SYSTOLIC BLOOD PRESSURE: 130 MMHG

## 2023-09-21 DIAGNOSIS — Z12.5 PROSTATE CANCER SCREENING: ICD-10-CM

## 2023-09-21 DIAGNOSIS — E78.00 PURE HYPERCHOLESTEROLEMIA: ICD-10-CM

## 2023-09-21 DIAGNOSIS — I10 ESSENTIAL HYPERTENSION, BENIGN: ICD-10-CM

## 2023-09-21 DIAGNOSIS — R73.9 HYPERGLYCEMIA: ICD-10-CM

## 2023-09-21 DIAGNOSIS — Z00.00 MEDICARE ANNUAL WELLNESS VISIT, SUBSEQUENT: Primary | ICD-10-CM

## 2023-09-21 LAB
ALBUMIN SERPL-MCNC: 4 G/DL (ref 3.4–5)
ALBUMIN/GLOB SERPL: 1.1 {RATIO} (ref 1–2)
ALP LIVER SERPL-CCNC: 47 U/L
ALT SERPL-CCNC: 30 U/L
ANION GAP SERPL CALC-SCNC: 8 MMOL/L (ref 0–18)
AST SERPL-CCNC: 24 U/L (ref 15–37)
BASOPHILS # BLD AUTO: 0.02 X10(3) UL (ref 0–0.2)
BASOPHILS NFR BLD AUTO: 0.3 %
BILIRUB SERPL-MCNC: 0.5 MG/DL (ref 0.1–2)
BILIRUB UR QL: NEGATIVE
BUN BLD-MCNC: 22 MG/DL (ref 7–18)
BUN/CREAT SERPL: 31 (ref 10–20)
CALCIUM BLD-MCNC: 9.3 MG/DL (ref 8.5–10.1)
CHLORIDE SERPL-SCNC: 107 MMOL/L (ref 98–112)
CHOLEST SERPL-MCNC: 202 MG/DL (ref ?–200)
CLARITY UR: CLEAR
CO2 SERPL-SCNC: 27 MMOL/L (ref 21–32)
COMPLEXED PSA SERPL-MCNC: 2.72 NG/ML (ref ?–4)
CREAT BLD-MCNC: 0.71 MG/DL
DEPRECATED RDW RBC AUTO: 49.6 FL (ref 35.1–46.3)
EGFRCR SERPLBLD CKD-EPI 2021: 98 ML/MIN/1.73M2 (ref 60–?)
EOSINOPHIL # BLD AUTO: 0.03 X10(3) UL (ref 0–0.7)
EOSINOPHIL NFR BLD AUTO: 0.5 %
ERYTHROCYTE [DISTWIDTH] IN BLOOD BY AUTOMATED COUNT: 14.7 % (ref 11–15)
EST. AVERAGE GLUCOSE BLD GHB EST-MCNC: 114 MG/DL (ref 68–126)
FASTING PATIENT LIPID ANSWER: YES
FASTING STATUS PATIENT QL REPORTED: YES
GLOBULIN PLAS-MCNC: 3.7 G/DL (ref 2.8–4.4)
GLUCOSE BLD-MCNC: 95 MG/DL (ref 70–99)
GLUCOSE UR-MCNC: NORMAL MG/DL
HBA1C MFR BLD: 5.6 % (ref ?–5.7)
HCT VFR BLD AUTO: 42.1 %
HDLC SERPL-MCNC: 74 MG/DL (ref 40–59)
HGB BLD-MCNC: 13.9 G/DL
HGB UR QL STRIP.AUTO: NEGATIVE
IMM GRANULOCYTES # BLD AUTO: 0.02 X10(3) UL (ref 0–1)
IMM GRANULOCYTES NFR BLD: 0.3 %
KETONES UR-MCNC: NEGATIVE MG/DL
LDLC SERPL CALC-MCNC: 118 MG/DL (ref ?–100)
LEUKOCYTE ESTERASE UR QL STRIP.AUTO: NEGATIVE
LYMPHOCYTES # BLD AUTO: 1.93 X10(3) UL (ref 1–4)
LYMPHOCYTES NFR BLD AUTO: 30.1 %
MCH RBC QN AUTO: 30.4 PG (ref 26–34)
MCHC RBC AUTO-ENTMCNC: 33 G/DL (ref 31–37)
MCV RBC AUTO: 92.1 FL
MONOCYTES # BLD AUTO: 0.58 X10(3) UL (ref 0.1–1)
MONOCYTES NFR BLD AUTO: 9 %
NEUTROPHILS # BLD AUTO: 3.83 X10 (3) UL (ref 1.5–7.7)
NEUTROPHILS # BLD AUTO: 3.83 X10(3) UL (ref 1.5–7.7)
NEUTROPHILS NFR BLD AUTO: 59.8 %
NITRITE UR QL STRIP.AUTO: NEGATIVE
NONHDLC SERPL-MCNC: 128 MG/DL (ref ?–130)
OSMOLALITY SERPL CALC.SUM OF ELEC: 297 MOSM/KG (ref 275–295)
PH UR: 5.5 [PH] (ref 5–8)
PLATELET # BLD AUTO: 278 10(3)UL (ref 150–450)
POTASSIUM SERPL-SCNC: 3.9 MMOL/L (ref 3.5–5.1)
PROT SERPL-MCNC: 7.7 G/DL (ref 6.4–8.2)
PROT UR-MCNC: NEGATIVE MG/DL
RBC # BLD AUTO: 4.57 X10(6)UL
SODIUM SERPL-SCNC: 142 MMOL/L (ref 136–145)
SP GR UR STRIP: 1.02 (ref 1–1.03)
T4 FREE SERPL-MCNC: 0.9 NG/DL (ref 0.8–1.7)
TRIGL SERPL-MCNC: 57 MG/DL (ref 30–149)
TSI SER-ACNC: 2.15 MIU/ML (ref 0.36–3.74)
UROBILINOGEN UR STRIP-ACNC: NORMAL
VLDLC SERPL CALC-MCNC: 10 MG/DL (ref 0–30)
WBC # BLD AUTO: 6.4 X10(3) UL (ref 4–11)

## 2023-09-21 PROCEDURE — G0439 PPPS, SUBSEQ VISIT: HCPCS | Performed by: INTERNAL MEDICINE

## 2023-09-21 PROCEDURE — 85025 COMPLETE CBC W/AUTO DIFF WBC: CPT

## 2023-09-21 PROCEDURE — 1125F AMNT PAIN NOTED PAIN PRSNT: CPT | Performed by: INTERNAL MEDICINE

## 2023-09-21 PROCEDURE — 84443 ASSAY THYROID STIM HORMONE: CPT

## 2023-09-21 PROCEDURE — 84439 ASSAY OF FREE THYROXINE: CPT

## 2023-09-21 PROCEDURE — 81003 URINALYSIS AUTO W/O SCOPE: CPT

## 2023-09-21 PROCEDURE — 80061 LIPID PANEL: CPT

## 2023-09-21 PROCEDURE — 83036 HEMOGLOBIN GLYCOSYLATED A1C: CPT | Performed by: INTERNAL MEDICINE

## 2023-09-21 PROCEDURE — 36415 COLL VENOUS BLD VENIPUNCTURE: CPT

## 2023-09-21 PROCEDURE — 82272 OCCULT BLD FECES 1-3 TESTS: CPT | Performed by: INTERNAL MEDICINE

## 2023-09-21 PROCEDURE — 80053 COMPREHEN METABOLIC PANEL: CPT

## 2023-09-21 RX ORDER — DESONIDE 0.5 MG/G
OINTMENT TOPICAL
COMMUNITY
Start: 2023-08-14

## 2023-09-21 RX ORDER — NYSTATIN 100000 U/G
CREAM TOPICAL
COMMUNITY
Start: 2023-08-14

## 2023-10-16 ENCOUNTER — TELEPHONE (OUTPATIENT)
Dept: INTERNAL MEDICINE CLINIC | Facility: CLINIC | Age: 73
End: 2023-10-16

## 2023-10-16 NOTE — TELEPHONE ENCOUNTER
Pt requesting a Medicare Annual right after his wife Claudette Angelucci. Mike Suzan is scheduled 11:30am on 9-23-24. Her GE is: [id number]     Late morning or early morning for both appts. Okay to move Claudette Angelucci [birth year] to keep two medicare annuals back to back. They are not early risers.

## 2023-10-16 NOTE — TELEPHONE ENCOUNTER
Patients appointment scheduled on the same day as his wife, Karen Coleman, on 9/23/24 at 12:30pm, patient notified.

## 2023-11-09 ENCOUNTER — TELEPHONE (OUTPATIENT)
Dept: INTERNAL MEDICINE CLINIC | Facility: CLINIC | Age: 73
End: 2023-11-09

## 2023-11-09 DIAGNOSIS — R41.3 MEMORY LOSS OF UNKNOWN CAUSE: Primary | ICD-10-CM

## 2023-11-09 NOTE — TELEPHONE ENCOUNTER
Wife asking to speak with nurse Megan Harvey. Patient had an appointment with a neurologist since May for next Thursday, received a call today stating the appointment was scheduled incorrectly and will call back in 2 days,     Wife states the appointment is for possible memory loss and his condition has worsen in the last 6 months.

## 2023-11-09 NOTE — TELEPHONE ENCOUNTER
I spoke with Houston Rhodes, patients wife, she states the patient has an appointment at [facility] and gave me information to call Milli at [phone]. I called the number and it is not in service. I left a message for Houston Rhodes to call back.

## 2023-11-09 NOTE — TELEPHONE ENCOUNTER
Dr Ryan Chavez, please advise    Last OV 23    Patient's wife, Mike Anchors calling (name and , STEPHENIE verified) regarding patient's ongoing memory loss issues for the last year which has not been evaluated by Neuro. Patient needs a referral for Neurology. Patient's wife states that they had an appt with a Neuro through [facility]. There was an issue with the appt for next week so now it was canceled. Patient's wife agrees to see a Neuro from [address] [place] [address]. Patient's wife states, this was discussed at last OV. Patient's wife is concerned that it will take several months to see Neuro and asking for any other recommendations or testing to expedite this process?     Referral pended for review/approval.

## 2023-11-09 NOTE — TELEPHONE ENCOUNTER
Royer Sun called back and she gave [phone]. I called and confirmed [facility], gave them the information about the patients appointment and they will have the office contact Dr Salud Kennedy if they need any testing or records.

## 2023-11-16 RX ORDER — AMLODIPINE BESYLATE AND BENAZEPRIL HYDROCHLORIDE 5; 10 MG/1; MG/1
1 CAPSULE ORAL DAILY
Qty: 90 CAPSULE | Refills: 3 | Status: SHIPPED | OUTPATIENT
Start: 2023-11-16

## 2024-05-20 ENCOUNTER — TELEPHONE (OUTPATIENT)
Dept: INTERNAL MEDICINE CLINIC | Facility: CLINIC | Age: 74
End: 2024-05-20

## 2024-05-20 NOTE — TELEPHONE ENCOUNTER
Called patient to reschedule annual well visit that was scheduled for 09/23/24, patient requesting to schedule appointment back to back with spouse Wild Rose who was scheduled for same day, not allowing scheduling same day as session limit is one for well visit, stated is open to any day, any time. Please advise, asking to call spouse Wild Rose back to schedule. Telephone encounter sent for spouse Wild Rose as well.

## 2024-05-20 NOTE — TELEPHONE ENCOUNTER
It is fine to schedule them back to back , as they have always done , since they come from so far away .

## 2024-08-29 PROBLEM — H43.12 VITREOUS HEMORRHAGE, LEFT EYE (HCC): Status: ACTIVE | Noted: 2024-08-29

## 2024-08-29 PROBLEM — H43.12 VITREOUS HEMORRHAGE, LEFT EYE (HCC): Status: RESOLVED | Noted: 2024-08-29 | Resolved: 2024-08-29

## 2024-08-29 PROBLEM — Z86.69 HISTORY OF VITREOUS HEMORRHAGE OF LEFT EYE: Status: ACTIVE | Noted: 2024-08-29

## 2024-09-03 ENCOUNTER — OFFICE VISIT (OUTPATIENT)
Dept: INTERNAL MEDICINE CLINIC | Facility: CLINIC | Age: 74
End: 2024-09-03
Payer: MEDICARE

## 2024-09-03 ENCOUNTER — LAB ENCOUNTER (OUTPATIENT)
Dept: LAB | Age: 74
End: 2024-09-03
Attending: INTERNAL MEDICINE
Payer: MEDICARE

## 2024-09-03 VITALS
TEMPERATURE: 98 F | HEIGHT: 67.5 IN | DIASTOLIC BLOOD PRESSURE: 80 MMHG | OXYGEN SATURATION: 98 % | BODY MASS INDEX: 21.1 KG/M2 | HEART RATE: 65 BPM | WEIGHT: 136 LBS | SYSTOLIC BLOOD PRESSURE: 130 MMHG

## 2024-09-03 DIAGNOSIS — Z12.5 PROSTATE CANCER SCREENING: ICD-10-CM

## 2024-09-03 DIAGNOSIS — I10 ESSENTIAL HYPERTENSION, BENIGN: ICD-10-CM

## 2024-09-03 DIAGNOSIS — R73.9 HYPERGLYCEMIA: ICD-10-CM

## 2024-09-03 DIAGNOSIS — E78.00 PURE HYPERCHOLESTEROLEMIA: ICD-10-CM

## 2024-09-03 DIAGNOSIS — Z00.00 MEDICARE ANNUAL WELLNESS VISIT, SUBSEQUENT: Primary | ICD-10-CM

## 2024-09-03 DIAGNOSIS — R41.3 MEMORY LOSS OF UNKNOWN CAUSE: ICD-10-CM

## 2024-09-03 PROBLEM — Z86.69 HISTORY OF VITREOUS HEMORRHAGE OF LEFT EYE: Status: RESOLVED | Noted: 2024-08-29 | Resolved: 2024-09-03

## 2024-09-03 LAB
ALBUMIN SERPL-MCNC: 4.5 G/DL (ref 3.2–4.8)
ALBUMIN/GLOB SERPL: 1.3 {RATIO} (ref 1–2)
ALP LIVER SERPL-CCNC: 58 U/L
ALT SERPL-CCNC: 17 U/L
ANION GAP SERPL CALC-SCNC: 3 MMOL/L (ref 0–18)
AST SERPL-CCNC: 25 U/L (ref ?–34)
BASOPHILS # BLD AUTO: 0.05 X10(3) UL (ref 0–0.2)
BASOPHILS NFR BLD AUTO: 0.7 %
BILIRUB SERPL-MCNC: 0.4 MG/DL (ref 0.2–1.1)
BILIRUB UR QL: NEGATIVE
BUN BLD-MCNC: 23 MG/DL (ref 9–23)
BUN/CREAT SERPL: 32.9 (ref 10–20)
CALCIUM BLD-MCNC: 9.6 MG/DL (ref 8.7–10.4)
CHLORIDE SERPL-SCNC: 104 MMOL/L (ref 98–112)
CHOLEST SERPL-MCNC: 225 MG/DL (ref ?–200)
CLARITY UR: CLEAR
CO2 SERPL-SCNC: 30 MMOL/L (ref 21–32)
COLOR UR: COLORLESS
CREAT BLD-MCNC: 0.7 MG/DL
DEPRECATED RDW RBC AUTO: 49 FL (ref 35.1–46.3)
EGFRCR SERPLBLD CKD-EPI 2021: 98 ML/MIN/1.73M2 (ref 60–?)
EOSINOPHIL # BLD AUTO: 0.05 X10(3) UL (ref 0–0.7)
EOSINOPHIL NFR BLD AUTO: 0.7 %
ERYTHROCYTE [DISTWIDTH] IN BLOOD BY AUTOMATED COUNT: 14.4 % (ref 11–15)
EST. AVERAGE GLUCOSE BLD GHB EST-MCNC: 117 MG/DL (ref 68–126)
FASTING PATIENT LIPID ANSWER: YES
FASTING STATUS PATIENT QL REPORTED: YES
GLOBULIN PLAS-MCNC: 3.5 G/DL (ref 2–3.5)
GLUCOSE BLD-MCNC: 93 MG/DL (ref 70–99)
GLUCOSE UR-MCNC: NORMAL MG/DL
HBA1C MFR BLD: 5.7 % (ref ?–5.7)
HCT VFR BLD AUTO: 40.8 %
HDLC SERPL-MCNC: 71 MG/DL (ref 40–59)
HGB BLD-MCNC: 13.3 G/DL
HGB UR QL STRIP.AUTO: NEGATIVE
IMM GRANULOCYTES # BLD AUTO: 0.02 X10(3) UL (ref 0–1)
IMM GRANULOCYTES NFR BLD: 0.3 %
KETONES UR-MCNC: NEGATIVE MG/DL
LDLC SERPL CALC-MCNC: 146 MG/DL (ref ?–100)
LEUKOCYTE ESTERASE UR QL STRIP.AUTO: NEGATIVE
LYMPHOCYTES # BLD AUTO: 2.09 X10(3) UL (ref 1–4)
LYMPHOCYTES NFR BLD AUTO: 30.1 %
MCH RBC QN AUTO: 30.2 PG (ref 26–34)
MCHC RBC AUTO-ENTMCNC: 32.6 G/DL (ref 31–37)
MCV RBC AUTO: 92.5 FL
MONOCYTES # BLD AUTO: 0.67 X10(3) UL (ref 0.1–1)
MONOCYTES NFR BLD AUTO: 9.6 %
NEUTROPHILS # BLD AUTO: 4.07 X10 (3) UL (ref 1.5–7.7)
NEUTROPHILS # BLD AUTO: 4.07 X10(3) UL (ref 1.5–7.7)
NEUTROPHILS NFR BLD AUTO: 58.6 %
NITRITE UR QL STRIP.AUTO: NEGATIVE
NONHDLC SERPL-MCNC: 154 MG/DL (ref ?–130)
OSMOLALITY SERPL CALC.SUM OF ELEC: 287 MOSM/KG (ref 275–295)
PH UR: 6.5 [PH] (ref 5–8)
PLATELET # BLD AUTO: 372 10(3)UL (ref 150–450)
POTASSIUM SERPL-SCNC: 3.9 MMOL/L (ref 3.5–5.1)
PROT SERPL-MCNC: 8 G/DL (ref 5.7–8.2)
PROT UR-MCNC: NEGATIVE MG/DL
RBC # BLD AUTO: 4.41 X10(6)UL
SODIUM SERPL-SCNC: 137 MMOL/L (ref 136–145)
SP GR UR STRIP: 1.01 (ref 1–1.03)
T4 FREE SERPL-MCNC: 1.1 NG/DL (ref 0.8–1.7)
TRIGL SERPL-MCNC: 47 MG/DL (ref 30–149)
TSI SER-ACNC: 2.37 MIU/ML (ref 0.55–4.78)
UROBILINOGEN UR STRIP-ACNC: NORMAL
VLDLC SERPL CALC-MCNC: 9 MG/DL (ref 0–30)
WBC # BLD AUTO: 7 X10(3) UL (ref 4–11)

## 2024-09-03 PROCEDURE — 83036 HEMOGLOBIN GLYCOSYLATED A1C: CPT | Performed by: INTERNAL MEDICINE

## 2024-09-03 PROCEDURE — 84439 ASSAY OF FREE THYROXINE: CPT

## 2024-09-03 PROCEDURE — 80061 LIPID PANEL: CPT

## 2024-09-03 PROCEDURE — 81003 URINALYSIS AUTO W/O SCOPE: CPT

## 2024-09-03 PROCEDURE — 80053 COMPREHEN METABOLIC PANEL: CPT

## 2024-09-03 PROCEDURE — 85025 COMPLETE CBC W/AUTO DIFF WBC: CPT

## 2024-09-03 PROCEDURE — 84443 ASSAY THYROID STIM HORMONE: CPT

## 2024-09-03 PROCEDURE — 36415 COLL VENOUS BLD VENIPUNCTURE: CPT

## 2024-09-03 PROCEDURE — G0439 PPPS, SUBSEQ VISIT: HCPCS | Performed by: INTERNAL MEDICINE

## 2024-09-03 RX ORDER — DONEPEZIL HYDROCHLORIDE 10 MG/1
TABLET, FILM COATED ORAL
COMMUNITY
Start: 2024-06-30

## 2024-09-03 NOTE — H&P
HPI:   Jesse Carvajal is a 74 year old male who presents for a Medicare Annual Wellness visit.  Patient no longer drives, dementia is profound , wife is his cargiver.   Patient Active Problem List   Diagnosis    Prostate cancer screening    Essential hypertension, benign    Hyperglycemia    Medicare annual wellness visit, subsequent    Pure hypercholesterolemia    Memory loss of unknown cause       General Health     In the past six months, have you lost more than 10 pounds without trying?: 2 - No    Has your appetite been poor?: No    Type of Diet: Balanced    How does the patient maintain a good energy level?: Daily Walks    How would you describe your daily physical activity?: Moderate    How would you describe your current health state?: Fair    How do you maintain positive mental well-being?: Social Interaction;Puzzles;Visiting Family         Have you had any immunizations at another office such as Influenza, Hepatitis B, Tetanus, or Pneumococcal?: No     Functional Ability     Bathing or Showering: Able without help    Toileting: Able without help    Dressing: Able without help    Eating: Able without help    Driving: Does not drive    Preparing your meals: Need some help    Managing money/bills: Cannot do without help    Taking medications as prescribed: Need some help    Are you able to afford your medications?: Yes    Hearing Problems?: No     Functional Status     Hearing Problems?: No    Vision Problems? : No    Difficulty walking?: No    Difficulty dressing or bathing?: No    Problems with daily activities? : No    Memory Problems?: Yes      Fall/Risk Assessment                                                              Depression Screening (PHQ-2/PHQ-9): Over the LAST 2 WEEKS                      Advance Directives     Do you have a healthcare power of ?: Yes    Do you have a living will?: Yes   Was Medicare Assessment Questionnaire completed by patient and sent to HIM for  scanning?Yes    Please go to \"Cognitive Assessment\" under Medicare Assessment section in Charting, test patient and document.  .    Then, refresh your progress note to see your input here.  Cognitive Assessment     What day of the week is this?: Correct    What month is it?: Correct    What year is it?: Correct    Recall \"Ball\": Correct    Recall \"Flag\": Correct    Recall \"Tree\": Correct      ALLERGIES:   No Known Allergies    CURRENT MEDICATIONS:   Current Outpatient Medications   Medication Sig Dispense Refill    donepezil 10 MG Oral Tab       amLODIPine Besy-Benazepril HCl 5-10 MG Oral Cap Take 1 capsule by mouth daily. 90 capsule 3    nystatin 100,000 Units/g External Cream APPLY THIN LAYER TOPICALLY TO GROIN RASH EVERY NIGHT AT BEDTIME      ALPRAZolam 0.25 MG Oral Tab Take 1 tablet (0.25 mg total) by mouth daily. 90 tablet 1    aspirin 81 MG Oral Tab EC Take 1 tablet (81 mg total) by mouth daily.      Desonide 0.05 % External Ointment         MEDICAL INFORMATION:   Past Medical History:    Chest pain    Depression    Fungal dermatitis    High blood pressure    High cholesterol    Near syncope    Osteoarthritis    Right hand weakness    Rotator cuff tear, left    Shortness of breath      Past Surgical History:   Procedure Laterality Date    Arthroscopy of joint unlisted Right     knee    Colonoscopy  2013    ADNENOMA POLYP.     Colonoscopy  2020    repeat 2030    Electrocardiogram, complete  11-    Scan to media tab 11-    Hernia surgery Right 2012    inguinal    Hernia surgery Left 08/2017    inguinal    Other surgical history  10/02/2017    hemorrhoidectomy      Family History   Problem Relation Age of Onset    Cancer Brother         prostate    Cancer Brother         prostate    Heart Disorder Father         Heart valve    Heart Disease Father         Heart disease/mitral valve, age 66 (cause of death)    Hypertension Mother     Other (Bowel obstruction) Mother         82 cause of death    Other  (80's) Mother     Heart Attack Other         Myocardial infarction, uncle      SOCIAL HISTORY:   Social History     Socioeconomic History    Marital status:    Tobacco Use    Smoking status: Never    Smokeless tobacco: Never   Vaping Use    Vaping status: Never Used   Substance and Sexual Activity    Alcohol use: Yes     Alcohol/week: 0.0 standard drinks of alcohol     Comment: 1-2 times per month, beer or wine    Drug use: No   Other Topics Concern    Caffeine Concern Yes     Comment: coffee, 1 cup daily    Exercise Yes     Comment: weights, home exercises, walk outside weather permitting   Social History Narrative    The patient does not use an assistive device..      The patient does live in a home with stairs.     Occ: retired  : yes      REVIEW OF SYSTEMS:   GENERAL: feels well otherwise  SKIN: denies any unusual skin lesions  EYES: denies blurred vision or double vision  HEENT: denies nasal congestion, sinus pain or ST  LUNGS: denies shortness of breath with exertion  CARDIOVASCULAR: denies chest pain on exertion  GI: denies abdominal pain, denies heartburn  : 1 per night nocturia, no complaint of urinary incontinence  MUSCULOSKELETAL: denies back pain  NEURO: denies headaches  PSYCHE: denies depression or anxiety  HEMATOLOGIC: denies hx of anemia  ENDOCRINE: denies thyroid history  ALL/ASTHMA: denies hx of allergy or asthma    EXAM:   /80   Pulse 65   Temp 97.7 °F (36.5 °C)   Ht 5' 7.5\" (1.715 m)   Wt 136 lb (61.7 kg)   SpO2 98%   BMI 20.99 kg/m²      >   Wt Readings from Last 6 Encounters:   09/03/24 136 lb (61.7 kg)   09/21/23 139 lb (63 kg)   09/15/22 137 lb (62.1 kg)   08/15/22 138 lb (62.6 kg)   02/14/22 138 lb (62.6 kg)   02/08/22 137 lb (62.1 kg)       >   BP Readings from Last 3 Encounters:   09/03/24 130/80   09/21/23 130/80   09/15/22 120/70       GENERAL: well developed, well nourished, in no apparent distress  SKIN: no rashes, no suspicious lesions  HEENT: atraumatic,  normocephalic, ears and throat are clear  Hearing assessed via: [place]  EYES: PERRLA, EOMI, conjunctiva are clear  Right Eye Visual Acuity: Uncorrected Left Eye Visual Acuity: Uncorrected   Right Eye Chart Acuity: 20/25 Left Eye Chart Acuity: 20/25   NECK: supple, no adenopathy, no bruits  CHEST: no chest tenderness  BREAST: no masses or discharge  LUNGS: clear to auscultation  CARDIO: RRR without murmur  GI: good BS's, no masses, HSM or tenderness  : two descended testicles, no masses, no hernia and no penile lesions  RECTAL: good rectal tone, prostate shows no masses  MUSCULOSKELETAL: back is not tender, FROM of the back  EXTREMITIES: no cyanosis, clubbing or edema.  NEURO: Oriented times three, cranial nerves are intact, motor and sensory are grossly intact    ASSESSMENT AND OTHER RELEVANT CHRONIC CONDITIONS:   Jesse Carvajal is a 74 year old male who presents for a Medicare Assessment.     PLAN SUMMARY:      Pure hypercholesterolemia  Lipids soon    Prostate cancer screening  Psa soon .     Memory loss of unknown cause  Stable.     Medicare annual wellness visit, subsequent  Physical today   Labs soon   See ophtho yearly   C scope 2030 .     Hyperglycemia  Glucose  hgba1c and UA .     Essential hypertension, benign  Bp is controlled on meds.     The patient indicates understanding of these issues and agrees to the plan.  The patient is asked to return in 1 year  for physical .       PREVENTATIVE SERVICES  INDICATIONS AND SCHEDULE Internal Lab or Procedure External Lab or Procedure   Diabetes Screening      HbgA1C   Annually HEMOGLOBIN A1c (% of total Hgb)   Date Value   08/26/2016 5.6     HgbA1C (%)   Date Value   09/21/2023 5.6         No data to display                Fasting Blood Sugar (FSB)Annually No results found for: \"GLUCOSE\"    Cardiovascular Disease Screening     LDL Annually LDL Cholesterol (mg/dL)   Date Value   09/21/2023 118 (H)     LDL-CHOLESTEROL (mg/dL (calc))   Date Value   08/26/2016 95         EKG One Time done    Colorectal Cancer Screening      Colonoscopy Screen every 10 years Health Maintenance   Topic Date Due    Colorectal Cancer Screening  01/17/2030    Update Health Maintenance if applicable    Flex Sigmoidoscopy Screen every 10 years No results found for this or any previous visit.      No data to display                 Fecal Occult Blood Annually No results found for: \"FOB\"      No data to display                Glaucoma Screening      Ophthalmology Visit Annually done    Prostate Cancer Screening      PSA  Annually Health Maintenance   Topic Date Due    PSA  09/21/2025     Update Health Maintenance if applicable   Immunizations      Influenza No orders found for this or any previous visit. Update Immunization Activity if applicable    Pneumococcal No orders found for this or any previous visit. Update Immunization Activity if applicable    Hepatitis B No orders found for this or any previous visit. Update Immunization Activity if applicable    Tetanus No orders found for this or any previous visit. Update Immunization Activity if applicable        SPECIFIC DISEASE MONITORING Internal Lab or Procedure External Lab or Procedure   Annual Monitoring of Persistent     Medications (ACE/ARB, digoxin diuretics, anticonvulsants.)    Potassium  Annually Potassium (mmol/L)   Date Value   09/21/2023 3.9     POTASSIUM (mmol/L)   Date Value   08/26/2016 4.3         No data to display                Creatinine  Annually CREATININE (mg/dL)   Date Value   08/26/2016 0.70     Creatinine (mg/dL)   Date Value   09/21/2023 0.71         No data to display                BUN  Annually BUN (mg/dL)   Date Value   09/21/2023 22 (H)     UREA NITROGEN (BUN) (mg/dL)   Date Value   08/26/2016 11         No data to display                 Drug Serum Conc  Annually No results found for: \"DIGOXIN\", \"DIG\", \"VALP\"      No data to display                Diabetes      HgbA1C  Annually HEMOGLOBIN A1c (% of total Hgb)   Date  Value   08/26/2016 5.6     HgbA1C (%)   Date Value   09/21/2023 5.6         No data to display                Creat/alb ratio  Annually      LDL  Annually LDL Cholesterol (mg/dL)   Date Value   09/21/2023 118 (H)     LDL-CHOLESTEROL (mg/dL (calc))   Date Value   08/26/2016 95         No data to display                 Dilated Eye exam  Annually      No data to display                   No data to display                COPD      Spirometry Testing Annually No results found for this or any previous visit.      No data to display                    SUGGESTED VACCINATIONS - Influenza, Pneumococcal, Zoster, Tetanus     Immunization History   Administered Date(s) Administered    Covid-19 Vaccine Pfizer 30 mcg/0.3 ml 03/10/2021, 04/07/2021, 10/27/2021    Covid-19 Vaccine Pfizer Bivalent 30mcg/0.3mL 12/15/2022    Covid-19 Vaccine Pfizer Dk-Sucrose 30 mcg/0.3 ml 07/01/2022

## 2025-01-05 RX ORDER — AMLODIPINE AND BENAZEPRIL HYDROCHLORIDE 5; 10 MG/1; MG/1
1 CAPSULE ORAL DAILY
Qty: 90 CAPSULE | Refills: 3 | Status: SHIPPED | OUTPATIENT
Start: 2025-01-05

## (undated) DEVICE — SHOULDER STABILIZATION KIT,                                    DISPOSABLE 12 PER BOX

## (undated) DEVICE — ABDOMINAL PAD: Brand: CURITY

## (undated) DEVICE — BANDAGE ROLL,100% COTTON, 6 PLY, LARGE: Brand: KERLIX

## (undated) DEVICE — STERILE LATEX POWDER-FREE SURGICAL GLOVES WITH HYDROGEL COATING, SMOOTH FINISH, STRAIGHT FINGER: Brand: PROTEXIS

## (undated) DEVICE — T-MAX DISPOSABLE FACE MASK 8 PER BOX

## (undated) DEVICE — VIOLET BRAIDED (POLYGLACTIN 910), SYNTHETIC ABSORBABLE SUTURE: Brand: COATED VICRYL

## (undated) DEVICE — INTENT TO BE USED WITH SUTURE MATERIAL FOR TISSUE CLOSURE: Brand: RICHARD-ALLAN® NEEDLE 1/2 CIRCLE TROCAR

## (undated) DEVICE — SUTURE VICRYL 2-0 FS-1

## (undated) DEVICE — SPECIALTY ARM SLING: Brand: DEROYAL

## (undated) DEVICE — STERILE POLYISOPRENE POWDER-FREE SURGICAL GLOVES: Brand: PROTEXIS

## (undated) DEVICE — SOL  .9 3000ML

## (undated) DEVICE — BATTERY

## (undated) DEVICE — 3M™ MEDITPORE™ SOFT CLOTH TAPE 6 IN X 10 YD 12 ROLLS/CASE 2966: Brand: 3M™ MEDIPORE™

## (undated) DEVICE — COVER SGL STRL LGHT HNDL BLU

## (undated) DEVICE — CHLORAPREP 26ML APPLICATOR

## (undated) DEVICE — DECANTER SPIKE TRANSFLOW

## (undated) DEVICE — THREADED CLEAR CANNULA WITH OBTURATOR 5.5MM X 75MM

## (undated) DEVICE — 60 ML SYRINGE LUER-LOCK TIP: Brand: MONOJECT

## (undated) DEVICE — BLADE SHVR EXCLBR COOLCUT 13CM

## (undated) DEVICE — 10K ARTHROSCOPY INFLOW/OUTFLOW TUBE SET: Brand: 10K

## (undated) DEVICE — SHOULDER: Brand: MEDLINE INDUSTRIES, INC.

## (undated) DEVICE — AMBIENT SUPER TURBOVAC 90 IFS: Brand: COBLATION

## (undated) DEVICE — 3M™ STERI-STRIP™ REINFORCED ADHESIVE SKIN CLOSURES, R1547, 1/2 IN X 4 IN (12 MM X 100 MM), 6 STRIPS/ENVELOPE: Brand: 3M™ STERI-STRIP™

## (undated) DEVICE — STERILE LATEX POWDER-FREE SURGICAL GLOVESWITH NITRILE COATING: Brand: PROTEXIS

## (undated) DEVICE — BLADE SHVR 13CM 4MM EXCLBR

## (undated) DEVICE — UNDYED BRAIDED (POLYGLACTIN 910), SYNTHETIC ABSORBABLE SUTURE: Brand: COATED VICRYL

## (undated) DEVICE — BURR SHVR 13CM 5.5MM COOLCUT

## (undated) DEVICE — 3M™ STERI-STRIP™ COMPOUND BENZOIN TINCTURE 40 BAGS/CARTON 4 CARTONS/CASE C1544: Brand: 3M™ STERI-STRIP™

## (undated) NOTE — LETTER
[address], [address], [address] [facility]  [address] [facility]  [phone]            June 8, 2018    The purpose of this Agreement is to prevent misunderstandings about certain medications y character and intensity of my pain, the effect of the pain on my daily life, and how well medicine is helping to relieve pain.    _______ I will not use any illegal controlled substances, including marijuana, cocaine, etc., nor will I misuse or self-prescr Date                 Time                 Signature of Witness